# Patient Record
Sex: FEMALE | Race: WHITE | HISPANIC OR LATINO | Employment: FULL TIME | URBAN - METROPOLITAN AREA
[De-identification: names, ages, dates, MRNs, and addresses within clinical notes are randomized per-mention and may not be internally consistent; named-entity substitution may affect disease eponyms.]

---

## 2023-12-05 NOTE — PROGRESS NOTES
"No referral in the system but it looks like the patient has been at the [facility] and probably has fibromyalgia with multiple musculoskeletal symptoms last note from spine health:  Impression:  Scoliosis  Cervical DDD  Chronic neck and medial scapula pain-may be some C6 referral pain.     Overall pain manageable currently as had recent TPI which helps apx 2 months.     Plan:  typical management options reviewed.  Questions were answered,      Does not feel return to PT will be helpful-tried in past w/o benefit several times.  Last PT attempt apx 2019.  2.    Patient Inquires re SCS trial, pain pump, and disc replacement which I advised I would not recommend at this time.  Typical surgical indications reviewed.  3.    May consider cervical SHEKHAR as is noting medial scapular pain that may be referral pain from the C5/6 but would like to see back in person to best assess.  Patient also notes would like to look into a \"fix\"  4.    Encouraged scoliosis x-ray  5.    F/U: in person.   May consider the cervical SHEKHAR, thoracic MRI, Dr. Ruelas consult.  Patient would also like images of MRI, advised to contact radiology, will attempt to get # for such.     The patient is instructed to call/seek urgent medical care with worsening pain or change of neurological status.     There are no barriers to patient education identified. Management options were discussed in detail.  The patient is in agreement with the plan as outlined above and verbalized understanding.       Trinh Castillo MD      History of Present Complaint:  The patient was referred to us by Referring Provider: Self-referral. this is 28 y.o.  female with a past history of history of scoliosis, bruising and gum bleeding she has been evaluated at [facility] with normal platelet count and function, PTT, INR, and negative von Willebrand panel.  The patient is here because of almost total spine pain but the worst is in the thoracic spine.  Patient has no paralysis no " paresthesias no incontinence.  The pain is localized to axial areas.  She never had any scoliosis evaluation by surgery.  And I do not see any x-rays of her scoliosis.  The patient has no red flags.        Procedures:   None    Portions of record reviewed for pertinent issues: active problem list, medication list, allergies, family history, social history, notes from last encounter, encounters, lab results, imaging and other available records.    I have personally reviewed the OARRS report for this patient. This report is scanned into the electronic medical record. I have considered the risks of abuse, dependence, addiction and diversion. It showed: No chronic opioid therapy  OPIOID RISK ASSESSMENT SCORE 6/26  Aberrant behavior: None      Diagnostic studies:  9/27/2023 MRI of the cervical spine at [facility] report showed mild stenosis at C4-5 to C6-7 otherwise normal MRI:  Mild cervical degenerative disc disease with mild canal stenosis at C4-5   through C6-7 as detailed above.  Otherwise normal study.  No evidence of   cord compression or intrinsic abnormality of the upper spinal cord by MRI.     Anatomic Variant:  None.  Assume 7 cervical vertebrae with counting from   the craniocervical junction.         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     Social History:  planning on having children and we discussed that and the impact of therapy on getting pregnant, finished masters degree in education denies smoking drinking or use of illicit drugs      Review of Systems   HENT: Negative.     Eyes: Negative.    Respiratory: Negative.     Cardiovascular: Negative.    Gastrointestinal: Negative.    Endocrine: Negative.    Genitourinary: Negative.    Musculoskeletal:  Positive for back pain and myalgias.   Skin: Negative.    Neurological: Negative.    Hematological: Negative.    Psychiatric/Behavioral: Negative.        Physical Exam  Vitals and nursing  note reviewed.   Constitutional:       Appearance: Normal appearance.   HENT:      Head: Normocephalic and atraumatic.      Nose: Nose normal.   Eyes:      Extraocular Movements: Extraocular movements intact.      Conjunctiva/sclera: Conjunctivae normal.      Pupils: Pupils are equal, round, and reactive to light.   Cardiovascular:      Rate and Rhythm: Normal rate and regular rhythm.      Pulses: Normal pulses.      Heart sounds: Normal heart sounds.   Pulmonary:      Effort: Pulmonary effort is normal.      Breath sounds: Normal breath sounds.   Abdominal:      General: Abdomen is flat. Bowel sounds are normal.      Palpations: Abdomen is soft.   Musculoskeletal:         General: Tenderness present.      Comments: S-shaped scoliosis of her thoracolumbar spine   Skin:     General: Skin is warm.   Neurological:      General: No focal deficit present.      Mental Status: She is alert and oriented to person, place, and time.      Cranial Nerves: Cranial nerves 2-12 are intact.      Sensory: Sensation is intact.      Motor: Motor function is intact.      Coordination: Coordination is intact.      Gait: Gait is intact.      Deep Tendon Reflexes: Reflexes are normal and symmetric.   Psychiatric:         Mood and Affect: Mood normal.         Behavior: Behavior normal.            Assessment  Very pleasant 28 years old with history of scoliosis and chronic back pain extend from her cervical spine to lumbar spine.  Mid thoracic spine is the worst where the curvature of the scoliosis is the worst.  The patient never been evaluated by surgeons.  Her exam today did not show any neurological deficit but for scoliotic changes of the thoracolumbar spine in addition to pain with extension lateral bending.  The patient is really eager to have babies and I explained to her that she cannot have babies while she is getting treatment from me.  We will start her on the IV infusion therapy she already have spine physical therapist who  works on her spine.  We will get her connected with the [facility] and I highly recommend annual x-ray of her scoliosis.  I may send her to Dr. Bates in the future.           Plan  At least 50% of the visit was involved in the discussion of the options for treatment. We discussed exercises, medication, interventional therapies and surgery. Healthy life style is essential with patient hard work to achieve the wellness. In addition; discussion with the patient and/or family about any of the diagnostic results, impressions and/or recommended diagnostic studies, prognosis, risks and benefits of treatment options, instructions for treatment and/or follow-up, importance of compliance with chosen treatment options, risk-factor reduction, and patient/family education.         Pool therapy, walking in the pool, at least 3x per week for 30 minutes  Neuro supplement ordered  Referral to [facility]  Patient to continue with her spine physical therapist  Total spine scoliosis x-ray ordered today  IV infusion therapy once every 2 weeks  Healthy lifestyle and anti-inflammatory diet in addition to weight control discussed with the patient  Alternative chronic pain therapies was discussed, encouraged and information was handed  Return to Clinic 3 months       *Please note this report has been produced using speech recognition software and may contain errors related to that system including grammar, punctuation and spelling as well as words and phrases that may be inappropriate. If there are questions or concerns, please feel free to contact me to clarify.    Slime Fu MD

## 2023-12-05 NOTE — H&P (VIEW-ONLY)
"No referral in the system but it looks like the patient has been at the [facility] and probably has fibromyalgia with multiple musculoskeletal symptoms last note from spine health:  Impression:  Scoliosis  Cervical DDD  Chronic neck and medial scapula pain-may be some C6 referral pain.     Overall pain manageable currently as had recent TPI which helps apx 2 months.     Plan:  typical management options reviewed.  Questions were answered,      Does not feel return to PT will be helpful-tried in past w/o benefit several times.  Last PT attempt apx 2019.  2.    Patient Inquires re SCS trial, pain pump, and disc replacement which I advised I would not recommend at this time.  Typical surgical indications reviewed.  3.    May consider cervical SHEKHAR as is noting medial scapular pain that may be referral pain from the C5/6 but would like to see back in person to best assess.  Patient also notes would like to look into a \"fix\"  4.    Encouraged scoliosis x-ray  5.    F/U: in person.   May consider the cervical SHEKHAR, thoracic MRI, Dr. Ruelas consult.  Patient would also like images of MRI, advised to contact radiology, will attempt to get # for such.     The patient is instructed to call/seek urgent medical care with worsening pain or change of neurological status.     There are no barriers to patient education identified. Management options were discussed in detail.  The patient is in agreement with the plan as outlined above and verbalized understanding.       Trinh Castillo MD      History of Present Complaint:  The patient was referred to us by Referring Provider: Self-referral. this is 28 y.o.  female with a past history of history of scoliosis, bruising and gum bleeding she has been evaluated at [facility] with normal platelet count and function, PTT, INR, and negative von Willebrand panel.  The patient is here because of almost total spine pain but the worst is in the thoracic spine.  Patient has no paralysis no " paresthesias no incontinence.  The pain is localized to axial areas.  She never had any scoliosis evaluation by surgery.  And I do not see any x-rays of her scoliosis.  The patient has no red flags.        Procedures:   None    Portions of record reviewed for pertinent issues: active problem list, medication list, allergies, family history, social history, notes from last encounter, encounters, lab results, imaging and other available records.    I have personally reviewed the OARRS report for this patient. This report is scanned into the electronic medical record. I have considered the risks of abuse, dependence, addiction and diversion. It showed: No chronic opioid therapy  OPIOID RISK ASSESSMENT SCORE 6/26  Aberrant behavior: None      Diagnostic studies:  9/27/2023 MRI of the cervical spine at [facility] report showed mild stenosis at C4-5 to C6-7 otherwise normal MRI:  Mild cervical degenerative disc disease with mild canal stenosis at C4-5   through C6-7 as detailed above.  Otherwise normal study.  No evidence of   cord compression or intrinsic abnormality of the upper spinal cord by MRI.     Anatomic Variant:  None.  Assume 7 cervical vertebrae with counting from   the craniocervical junction.         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     Social History:  planning on having children and we discussed that and the impact of therapy on getting pregnant, finished masters degree in education denies smoking drinking or use of illicit drugs      Review of Systems   HENT: Negative.     Eyes: Negative.    Respiratory: Negative.     Cardiovascular: Negative.    Gastrointestinal: Negative.    Endocrine: Negative.    Genitourinary: Negative.    Musculoskeletal:  Positive for back pain and myalgias.   Skin: Negative.    Neurological: Negative.    Hematological: Negative.    Psychiatric/Behavioral: Negative.        Physical Exam  Vitals and nursing  note reviewed.   Constitutional:       Appearance: Normal appearance.   HENT:      Head: Normocephalic and atraumatic.      Nose: Nose normal.   Eyes:      Extraocular Movements: Extraocular movements intact.      Conjunctiva/sclera: Conjunctivae normal.      Pupils: Pupils are equal, round, and reactive to light.   Cardiovascular:      Rate and Rhythm: Normal rate and regular rhythm.      Pulses: Normal pulses.      Heart sounds: Normal heart sounds.   Pulmonary:      Effort: Pulmonary effort is normal.      Breath sounds: Normal breath sounds.   Abdominal:      General: Abdomen is flat. Bowel sounds are normal.      Palpations: Abdomen is soft.   Musculoskeletal:         General: Tenderness present.      Comments: S-shaped scoliosis of her thoracolumbar spine   Skin:     General: Skin is warm.   Neurological:      General: No focal deficit present.      Mental Status: She is alert and oriented to person, place, and time.      Cranial Nerves: Cranial nerves 2-12 are intact.      Sensory: Sensation is intact.      Motor: Motor function is intact.      Coordination: Coordination is intact.      Gait: Gait is intact.      Deep Tendon Reflexes: Reflexes are normal and symmetric.   Psychiatric:         Mood and Affect: Mood normal.         Behavior: Behavior normal.            Assessment  Very pleasant 28 years old with history of scoliosis and chronic back pain extend from her cervical spine to lumbar spine.  Mid thoracic spine is the worst where the curvature of the scoliosis is the worst.  The patient never been evaluated by surgeons.  Her exam today did not show any neurological deficit but for scoliotic changes of the thoracolumbar spine in addition to pain with extension lateral bending.  The patient is really eager to have babies and I explained to her that she cannot have babies while she is getting treatment from me.  We will start her on the IV infusion therapy she already have spine physical therapist who  works on her spine.  We will get her connected with the [facility] and I highly recommend annual x-ray of her scoliosis.  I may send her to Dr. Bates in the future.           Plan  At least 50% of the visit was involved in the discussion of the options for treatment. We discussed exercises, medication, interventional therapies and surgery. Healthy life style is essential with patient hard work to achieve the wellness. In addition; discussion with the patient and/or family about any of the diagnostic results, impressions and/or recommended diagnostic studies, prognosis, risks and benefits of treatment options, instructions for treatment and/or follow-up, importance of compliance with chosen treatment options, risk-factor reduction, and patient/family education.         Pool therapy, walking in the pool, at least 3x per week for 30 minutes  Neuro supplement ordered  Referral to [facility]  Patient to continue with her spine physical therapist  Total spine scoliosis x-ray ordered today  IV infusion therapy once every 2 weeks  Healthy lifestyle and anti-inflammatory diet in addition to weight control discussed with the patient  Alternative chronic pain therapies was discussed, encouraged and information was handed  Return to Clinic 3 months       *Please note this report has been produced using speech recognition software and may contain errors related to that system including grammar, punctuation and spelling as well as words and phrases that may be inappropriate. If there are questions or concerns, please feel free to contact me to clarify.    Slime Fu MD

## 2023-12-06 ENCOUNTER — OFFICE VISIT (OUTPATIENT)
Dept: PAIN MEDICINE | Facility: CLINIC | Age: 28
End: 2023-12-06
Payer: COMMERCIAL

## 2023-12-06 VITALS
WEIGHT: 170 LBS | HEART RATE: 106 BPM | TEMPERATURE: 97.9 F | RESPIRATION RATE: 16 BRPM | HEIGHT: 64 IN | SYSTOLIC BLOOD PRESSURE: 121 MMHG | DIASTOLIC BLOOD PRESSURE: 80 MMHG | BODY MASS INDEX: 29.02 KG/M2

## 2023-12-06 DIAGNOSIS — M79.7 FIBROMYALGIA: ICD-10-CM

## 2023-12-06 DIAGNOSIS — M41.115 JUVENILE IDIOPATHIC SCOLIOSIS OF THORACOLUMBAR REGION: ICD-10-CM

## 2023-12-06 DIAGNOSIS — M47.25 OTHER SPONDYLOSIS WITH RADICULOPATHY, THORACOLUMBAR REGION: Primary | ICD-10-CM

## 2023-12-06 PROCEDURE — 99214 OFFICE O/P EST MOD 30 MIN: CPT | Performed by: ANESTHESIOLOGY

## 2023-12-06 PROCEDURE — 99204 OFFICE O/P NEW MOD 45 MIN: CPT | Performed by: ANESTHESIOLOGY

## 2023-12-06 PROCEDURE — 1036F TOBACCO NON-USER: CPT | Performed by: ANESTHESIOLOGY

## 2023-12-06 RX ORDER — ONDANSETRON HYDROCHLORIDE 2 MG/ML
4 INJECTION, SOLUTION INTRAVENOUS ONCE
OUTPATIENT
Start: 2023-12-06 | End: 2023-12-06

## 2023-12-06 RX ORDER — KETOROLAC TROMETHAMINE 30 MG/ML
30 INJECTION, SOLUTION INTRAMUSCULAR; INTRAVENOUS ONCE
OUTPATIENT
Start: 2023-12-06 | End: 2023-12-06

## 2023-12-06 RX ORDER — NITROGLYCERIN 0.4 MG/1
0.4 TABLET SUBLINGUAL ONCE
OUTPATIENT
Start: 2023-12-06 | End: 2023-12-06

## 2023-12-06 RX ORDER — FAMOTIDINE 10 MG/ML
20 INJECTION INTRAVENOUS ONCE AS NEEDED
OUTPATIENT
Start: 2023-12-06

## 2023-12-06 RX ORDER — LORAZEPAM 2 MG/1
TABLET ORAL
Qty: 1 TABLET | Refills: 0 | Status: SHIPPED | OUTPATIENT
Start: 2023-12-06

## 2023-12-06 RX ORDER — EPINEPHRINE 0.3 MG/.3ML
0.3 INJECTION SUBCUTANEOUS EVERY 5 MIN PRN
OUTPATIENT
Start: 2023-12-06

## 2023-12-06 RX ORDER — METOPROLOL TARTRATE 25 MG/1
25 TABLET, FILM COATED ORAL ONCE
OUTPATIENT
Start: 2023-12-06 | End: 2023-12-06

## 2023-12-06 RX ORDER — LISDEXAMFETAMINE DIMESYLATE 50 MG/1
50 CAPSULE ORAL DAILY
COMMUNITY
Start: 2023-08-28

## 2023-12-06 RX ORDER — DIPHENHYDRAMINE HYDROCHLORIDE 50 MG/ML
50 INJECTION INTRAMUSCULAR; INTRAVENOUS AS NEEDED
OUTPATIENT
Start: 2023-12-06

## 2023-12-06 RX ORDER — ALBUTEROL SULFATE 0.83 MG/ML
3 SOLUTION RESPIRATORY (INHALATION) AS NEEDED
OUTPATIENT
Start: 2023-12-06

## 2023-12-06 RX ORDER — DULOXETIN HYDROCHLORIDE 60 MG/1
60 CAPSULE, DELAYED RELEASE ORAL DAILY
COMMUNITY

## 2023-12-06 ASSESSMENT — ENCOUNTER SYMPTOMS
GASTROINTESTINAL NEGATIVE: 1
MYALGIAS: 1
HEMATOLOGIC/LYMPHATIC NEGATIVE: 1
LOSS OF SENSATION IN FEET: 0
PSYCHIATRIC NEGATIVE: 1
NEUROLOGICAL NEGATIVE: 1
BACK PAIN: 1
DEPRESSION: 0
OCCASIONAL FEELINGS OF UNSTEADINESS: 0
CARDIOVASCULAR NEGATIVE: 1
RESPIRATORY NEGATIVE: 1
EYES NEGATIVE: 1
ENDOCRINE NEGATIVE: 1

## 2023-12-06 ASSESSMENT — COLUMBIA-SUICIDE SEVERITY RATING SCALE - C-SSRS
6. HAVE YOU EVER DONE ANYTHING, STARTED TO DO ANYTHING, OR PREPARED TO DO ANYTHING TO END YOUR LIFE?: NO
2. HAVE YOU ACTUALLY HAD ANY THOUGHTS OF KILLING YOURSELF?: NO
1. IN THE PAST MONTH, HAVE YOU WISHED YOU WERE DEAD OR WISHED YOU COULD GO TO SLEEP AND NOT WAKE UP?: NO

## 2023-12-06 ASSESSMENT — PAIN SCALES - GENERAL
PAINLEVEL_OUTOF10: 6
PAINLEVEL: 6

## 2023-12-06 ASSESSMENT — PATIENT HEALTH QUESTIONNAIRE - PHQ9
2. FEELING DOWN, DEPRESSED OR HOPELESS: NOT AT ALL
1. LITTLE INTEREST OR PLEASURE IN DOING THINGS: NOT AT ALL
SUM OF ALL RESPONSES TO PHQ9 QUESTIONS 1 AND 2: 0

## 2023-12-06 ASSESSMENT — PAIN DESCRIPTION - DESCRIPTORS: DESCRIPTORS: ACHING;BURNING

## 2023-12-06 ASSESSMENT — PAIN - FUNCTIONAL ASSESSMENT: PAIN_FUNCTIONAL_ASSESSMENT: 0-10

## 2023-12-06 ASSESSMENT — LIFESTYLE VARIABLES: TOTAL SCORE: 6

## 2023-12-06 NOTE — PROGRESS NOTES
Chief Complaint   Patient presents with    Back Pain    New Patient Visit    Neck Pain     History of Present Complaint:  The patient was referred to us by Referring Provider: Self referral . this is 28 y.o.  female  Not accompanied by another person with a past history of  history of Raynaud's ,anxiety, scoliosis, binge eating.  presenting with  back pain neck pain.  Patient states that she gets numbness in her hands.  You    Pain started 2016 has progressively got worse 2020   Pain is Progressively getting worse  Back and neck worse   The pain is described as  back pain and dull aching lots of burning  and is relieved by Kenalog trigger point injections that she had done by her self or her  .      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  Social history: , Finished high school, and Higher education Masters nonprofit organization    Diagnostic studies: MRI studies, xray's    Opioid Risk Assessment Score 6/26 history of anxiety.

## 2023-12-14 PROBLEM — N92.6 IRREGULAR MENSTRUAL CYCLE: Status: ACTIVE | Noted: 2023-03-23

## 2023-12-14 PROBLEM — F50.819 BINGE EATING DISORDER: Status: ACTIVE | Noted: 2023-03-23

## 2023-12-14 PROBLEM — I73.00 RAYNAUD'S DISEASE: Status: ACTIVE | Noted: 2023-12-14

## 2023-12-14 PROBLEM — M41.9 SCOLIOSIS: Status: ACTIVE | Noted: 2020-02-05

## 2023-12-14 PROBLEM — F50.81 BINGE EATING DISORDER: Status: ACTIVE | Noted: 2023-03-23

## 2023-12-14 PROBLEM — B37.31 YEAST VAGINITIS: Status: ACTIVE | Noted: 2023-12-14

## 2023-12-18 ENCOUNTER — HOSPITAL ENCOUNTER (OUTPATIENT)
Dept: PAIN MEDICINE | Facility: CLINIC | Age: 28
Discharge: HOME | End: 2023-12-18
Payer: COMMERCIAL

## 2023-12-18 ENCOUNTER — ANCILLARY PROCEDURE (OUTPATIENT)
Dept: RADIOLOGY | Facility: CLINIC | Age: 28
End: 2023-12-18
Payer: COMMERCIAL

## 2023-12-18 VITALS
WEIGHT: 130 LBS | SYSTOLIC BLOOD PRESSURE: 113 MMHG | HEART RATE: 116 BPM | RESPIRATION RATE: 16 BRPM | HEIGHT: 64 IN | TEMPERATURE: 98.4 F | BODY MASS INDEX: 22.2 KG/M2 | OXYGEN SATURATION: 100 % | DIASTOLIC BLOOD PRESSURE: 78 MMHG

## 2023-12-18 DIAGNOSIS — M41.115 JUVENILE IDIOPATHIC SCOLIOSIS OF THORACOLUMBAR REGION: ICD-10-CM

## 2023-12-18 DIAGNOSIS — M79.7 FIBROMYALGIA: ICD-10-CM

## 2023-12-18 LAB — PREGNANCY TEST URINE, POC: NEGATIVE

## 2023-12-18 PROCEDURE — A4216 STERILE WATER/SALINE, 10 ML: HCPCS

## 2023-12-18 PROCEDURE — 62323 NJX INTERLAMINAR LMBR/SAC: CPT | Performed by: ANESTHESIOLOGY

## 2023-12-18 PROCEDURE — 2500000005 HC RX 250 GENERAL PHARMACY W/O HCPCS

## 2023-12-18 PROCEDURE — 77003 FLUOROGUIDE FOR SPINE INJECT: CPT

## 2023-12-18 PROCEDURE — 2500000004 HC RX 250 GENERAL PHARMACY W/ HCPCS (ALT 636 FOR OP/ED)

## 2023-12-18 PROCEDURE — 7100000010 HC PHASE TWO TIME - EACH INCREMENTAL 1 MINUTE

## 2023-12-18 PROCEDURE — 7100000009 HC PHASE TWO TIME - INITIAL BASE CHARGE

## 2023-12-18 RX ORDER — METHYLPREDNISOLONE ACETATE 80 MG/ML
INJECTION, SUSPENSION INTRA-ARTICULAR; INTRALESIONAL; INTRAMUSCULAR; SOFT TISSUE
Status: COMPLETED
Start: 2023-12-18 | End: 2023-12-18

## 2023-12-18 RX ORDER — SODIUM CHLORIDE 9 MG/ML
INJECTION, SOLUTION INTRAMUSCULAR; INTRAVENOUS; SUBCUTANEOUS
Status: COMPLETED
Start: 2023-12-18 | End: 2023-12-18

## 2023-12-18 RX ORDER — LIDOCAINE HYDROCHLORIDE 5 MG/ML
INJECTION, SOLUTION INFILTRATION; INTRAVENOUS
Status: COMPLETED
Start: 2023-12-18 | End: 2023-12-18

## 2023-12-18 RX ADMIN — SODIUM CHLORIDE 10 ML: 9 INJECTION, SOLUTION INTRAMUSCULAR; INTRAVENOUS; SUBCUTANEOUS at 09:06

## 2023-12-18 RX ADMIN — METHYLPREDNISOLONE ACETATE 80 MG: 80 INJECTION, SUSPENSION INTRA-ARTICULAR; INTRALESIONAL; INTRAMUSCULAR; SOFT TISSUE at 09:06

## 2023-12-18 RX ADMIN — LIDOCAINE HYDROCHLORIDE 250 MG: 5 INJECTION, SOLUTION INFILTRATION at 09:06

## 2023-12-18 ASSESSMENT — COLUMBIA-SUICIDE SEVERITY RATING SCALE - C-SSRS
6. HAVE YOU EVER DONE ANYTHING, STARTED TO DO ANYTHING, OR PREPARED TO DO ANYTHING TO END YOUR LIFE?: NO
1. IN THE PAST MONTH, HAVE YOU WISHED YOU WERE DEAD OR WISHED YOU COULD GO TO SLEEP AND NOT WAKE UP?: NO
2. HAVE YOU ACTUALLY HAD ANY THOUGHTS OF KILLING YOURSELF?: NO

## 2023-12-18 ASSESSMENT — PAIN SCALES - GENERAL
PAINLEVEL_OUTOF10: 0 - NO PAIN
PAINLEVEL_OUTOF10: 8

## 2023-12-18 ASSESSMENT — PAIN - FUNCTIONAL ASSESSMENT: PAIN_FUNCTIONAL_ASSESSMENT: 0-10

## 2023-12-18 NOTE — OP NOTE
Patient has no changes in health medical management or allergies since last visit on 12/6/2023    Preop diagnosis: Lumbar spondylosis with radiculopathy  Postoperative diagnosis: The same    PROCEDURE bilateralL1-L2  Lumbar Interlaminar Epidural    Estimated blood loss: None  Complication: None     CLINICAL NOTE: The patient is a pleasant 28 y.o.  female with a significant history of bilateral mid thoracic pain.  Physical exam and radiological findings correlate with the pre-operative diagnoses.  Because of these findings we elected to proceed with transforaminal epidural steroid injection at the above level (s).      PROCEDURE: After sufficient consent was signed, the patient was brought to the Operating Room, and placed in a prone position with a pillow underneath the hip. The patient's lumbar spine was in a flexed position. The lumbar area was then prepped and draped in the usual fashion.     Under fluoroscopic guidance, the bilateralL1-L2 interspace was identified. The skin at the entry site was infiltrated with 1% Lidocaine using a size 25 gauge needle. A size Touhy needle 18G, 3.5 inch was introduced gradually until the inferior lamina of the interspace was encountered. The needle was gradually advanced until it entered into the ligament. At that time loss of resistance technique was used, and the needle advanced gradually until the negative pressure was noticed in the epidural space. The bevel of the needle was placed laterally, and 3 cc of Omnipaque 300 were injected. This revealed excellent distribution of the dye in the epidural space, and around the nerve root of the affected side of the lumbar spine. At that time, a mixture of Depo-Medrol 80 mg Lidocaine 30  mg and Normal Saline for a total volume of 10 cc were injected . The needle was flushed and removed.     The patient tolerated the procedure very well and was transferred to the Recovery Room in stable condition.  The patient had stable resolution of  symptoms.  Patient to follow-up in the Pain Management Clinic as scheduled.

## 2023-12-20 ENCOUNTER — TELEPHONE (OUTPATIENT)
Dept: PAIN MEDICINE | Facility: CLINIC | Age: 28
End: 2023-12-20
Payer: COMMERCIAL

## 2024-01-21 NOTE — H&P (VIEW-ONLY)
2/2/2024 addendum:  1/31/2020.  EMG done by Dr. Gayla Howard MD showed carpal tunnel syndrome no signs of radiculopathy    SUBJECTIVE:  This is 28 y.o.  female with PMH of scoliosis, bruising and gum bleeding she has been evaluated at [facility] with normal platelet count and function, PTT, INR, and negative von Willebrand panel.  The patient had significant spinal pain worse in the mid thoracic and lower thoracic area received bilateral L1-2 interlaminar SHEKHAR on 12/18/2023 who is here for follow-up discussing the pain in her neck and the tingling in her right thumb.  Carpal tunnel maneuver caused some increased tingling in the thumb.  I will order an EMG on her to differentiate between radiculopathy and carpal tunnel.  Her MRI results from [facility] showed very mild foraminal stenosis does not explain radiculitis.  The patient stated that when she does neck traction that released the pressure on her thumb and that the tingling goes away.  We are going to proceed with cervical epidural steroid injection on the right side at C6-7 after lorazepam and covering her pelvis with a C arm down to prevent radiation to her ovaries.  Also I discussed with her that the lorazepam is not a medication she can get pregnant while she is taking it.      Prior office visit:  12/6/2023: The patient was referred to us by Referring Provider: Self-referral. this is 28 y.o.  female with a past history of history of scoliosis, bruising and gum bleeding she has been evaluated at [facility] with normal platelet count and function, PTT, INR, and negative von Willebrand panel.  The patient is here because of almost total spine pain but the worst is in the thoracic spine.  Patient has no paralysis no paresthesias no incontinence.  The pain is localized to axial areas.  She never had any scoliosis evaluation by surgery.  And I do not see any x-rays of her scoliosis.  The patient has no red flags.           Procedures:   12/18/2023 bilateral L1-2  interlaminar SHEKHAR the patient has had a 0% improvement in pain and function     Portions of record reviewed for pertinent issues: active problem list, medication list, allergies, family history, social history, notes from last encounter, encounters, lab results, imaging and other available records.     I have personally reviewed the OARRS report for this patient. This report is scanned into the electronic medical record. I have considered the risks of abuse, dependence, addiction and diversion. It showed: No chronic opioid therapy, Vyvanse 60 mg  OPIOID RISK ASSESSMENT SCORE 6/26  Aberrant behavior: None        Diagnostic studies:  9/27/2023 MRI of the cervical spine at [facility] report showed mild stenosis at C4-5 to C6-7 otherwise normal MRI:  Mild cervical degenerative disc disease with mild canal stenosis at C4-5   through C6-7 as detailed above.  Otherwise normal study.  No evidence of   cord compression or intrinsic abnormality of the upper spinal cord by MRI.     Anatomic Variant:  None.  Assume 7 cervical vertebrae with counting from   the craniocervical junction.            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      Social History:  planning on having children and we discussed that and the impact of therapy on getting pregnant, finished masters degree in education denies smoking drinking or use of illicit drugs      Review of Systems   HENT: Negative.     Eyes: Negative.    Respiratory: Negative.     Cardiovascular: Negative.    Gastrointestinal: Negative.    Endocrine: Negative.    Genitourinary: Negative.    Musculoskeletal:  Positive for myalgias and neck pain.   Skin: Negative.    Neurological:         Right hand thumb tingling   Hematological: Negative.    Psychiatric/Behavioral: Negative.          Physical Exam  Vitals and nursing note reviewed.   Constitutional:       Appearance: Normal appearance.   HENT:      Head: Normocephalic  and atraumatic.      Nose: Nose normal.   Eyes:      Extraocular Movements: Extraocular movements intact.      Conjunctiva/sclera: Conjunctivae normal.      Pupils: Pupils are equal, round, and reactive to light.   Cardiovascular:      Rate and Rhythm: Normal rate and regular rhythm.      Pulses: Normal pulses.      Heart sounds: Normal heart sounds.   Pulmonary:      Effort: Pulmonary effort is normal.      Breath sounds: Normal breath sounds.   Abdominal:      General: Abdomen is flat. Bowel sounds are normal.      Palpations: Abdomen is soft.   Musculoskeletal:         General: Tenderness present.   Skin:     General: Skin is warm.   Neurological:      General: No focal deficit present.      Mental Status: She is alert and oriented to person, place, and time.   Psychiatric:         Mood and Affect: Mood normal.         Behavior: Behavior normal.                      Plan  At least 50% of the visit was involved in the discussion of the options for treatment. We discussed exercises, medication, interventional therapies and surgery. Healthy life style is essential with patient hard work to achieve the wellness. In addition; discussion with the patient and/or family about any of the diagnostic results, impressions and/or recommended diagnostic studies, prognosis, risks and benefits of treatment options, instructions for treatment and/or follow-up, importance of compliance with chosen treatment options, risk-factor reduction, and patient/family education.         Pool therapy, walking in the pool, at least 3x per week for 30 minutes  Continue self-directed physical therapy  EMG of the right upper extremity to differentiate between carpal tunnel syndrome versus radiculopathy  Right C6-7 interlaminar SHEKHAR after lorazepam and use a gown to cover the abdomen and the pelvis to prevent radiation of the ovaries during the procedure  Healthy lifestyle and anti-inflammatory diet in addition to weight control discussed with the  patient  Alternative chronic pain therapies was discussed, encouraged and information was handed  Return to Clinic 3 months     *Please note this report has been produced using speech recognition software and may contain errors related to that system including grammar, punctuation and spelling as well as words and phrases that may be inappropriate. If there are questions or concerns, please feel free to contact me to clarify.    Slime Fu MD

## 2024-01-21 NOTE — PROGRESS NOTES
2/2/2024 addendum:  1/31/2020.  EMG done by Dr. Gayla Howard MD showed carpal tunnel syndrome no signs of radiculopathy    SUBJECTIVE:  This is 28 y.o.  female with PMH of scoliosis, bruising and gum bleeding she has been evaluated at [facility] with normal platelet count and function, PTT, INR, and negative von Willebrand panel.  The patient had significant spinal pain worse in the mid thoracic and lower thoracic area received bilateral L1-2 interlaminar SHEKHAR on 12/18/2023 who is here for follow-up discussing the pain in her neck and the tingling in her right thumb.  Carpal tunnel maneuver caused some increased tingling in the thumb.  I will order an EMG on her to differentiate between radiculopathy and carpal tunnel.  Her MRI results from [facility] showed very mild foraminal stenosis does not explain radiculitis.  The patient stated that when she does neck traction that released the pressure on her thumb and that the tingling goes away.  We are going to proceed with cervical epidural steroid injection on the right side at C6-7 after lorazepam and covering her pelvis with a C arm down to prevent radiation to her ovaries.  Also I discussed with her that the lorazepam is not a medication she can get pregnant while she is taking it.      Prior office visit:  12/6/2023: The patient was referred to us by Referring Provider: Self-referral. this is 28 y.o.  female with a past history of history of scoliosis, bruising and gum bleeding she has been evaluated at [facility] with normal platelet count and function, PTT, INR, and negative von Willebrand panel.  The patient is here because of almost total spine pain but the worst is in the thoracic spine.  Patient has no paralysis no paresthesias no incontinence.  The pain is localized to axial areas.  She never had any scoliosis evaluation by surgery.  And I do not see any x-rays of her scoliosis.  The patient has no red flags.           Procedures:   12/18/2023 bilateral L1-2  interlaminar SHEKHAR the patient has had a 0% improvement in pain and function     Portions of record reviewed for pertinent issues: active problem list, medication list, allergies, family history, social history, notes from last encounter, encounters, lab results, imaging and other available records.     I have personally reviewed the OARRS report for this patient. This report is scanned into the electronic medical record. I have considered the risks of abuse, dependence, addiction and diversion. It showed: No chronic opioid therapy, Vyvanse 60 mg  OPIOID RISK ASSESSMENT SCORE 6/26  Aberrant behavior: None        Diagnostic studies:  9/27/2023 MRI of the cervical spine at [facility] report showed mild stenosis at C4-5 to C6-7 otherwise normal MRI:  Mild cervical degenerative disc disease with mild canal stenosis at C4-5   through C6-7 as detailed above.  Otherwise normal study.  No evidence of   cord compression or intrinsic abnormality of the upper spinal cord by MRI.     Anatomic Variant:  None.  Assume 7 cervical vertebrae with counting from   the craniocervical junction.            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      Social History:  planning on having children and we discussed that and the impact of therapy on getting pregnant, finished masters degree in education denies smoking drinking or use of illicit drugs      Review of Systems   HENT: Negative.     Eyes: Negative.    Respiratory: Negative.     Cardiovascular: Negative.    Gastrointestinal: Negative.    Endocrine: Negative.    Genitourinary: Negative.    Musculoskeletal:  Positive for myalgias and neck pain.   Skin: Negative.    Neurological:         Right hand thumb tingling   Hematological: Negative.    Psychiatric/Behavioral: Negative.          Physical Exam  Vitals and nursing note reviewed.   Constitutional:       Appearance: Normal appearance.   HENT:      Head: Normocephalic  and atraumatic.      Nose: Nose normal.   Eyes:      Extraocular Movements: Extraocular movements intact.      Conjunctiva/sclera: Conjunctivae normal.      Pupils: Pupils are equal, round, and reactive to light.   Cardiovascular:      Rate and Rhythm: Normal rate and regular rhythm.      Pulses: Normal pulses.      Heart sounds: Normal heart sounds.   Pulmonary:      Effort: Pulmonary effort is normal.      Breath sounds: Normal breath sounds.   Abdominal:      General: Abdomen is flat. Bowel sounds are normal.      Palpations: Abdomen is soft.   Musculoskeletal:         General: Tenderness present.   Skin:     General: Skin is warm.   Neurological:      General: No focal deficit present.      Mental Status: She is alert and oriented to person, place, and time.   Psychiatric:         Mood and Affect: Mood normal.         Behavior: Behavior normal.                      Plan  At least 50% of the visit was involved in the discussion of the options for treatment. We discussed exercises, medication, interventional therapies and surgery. Healthy life style is essential with patient hard work to achieve the wellness. In addition; discussion with the patient and/or family about any of the diagnostic results, impressions and/or recommended diagnostic studies, prognosis, risks and benefits of treatment options, instructions for treatment and/or follow-up, importance of compliance with chosen treatment options, risk-factor reduction, and patient/family education.         Pool therapy, walking in the pool, at least 3x per week for 30 minutes  Continue self-directed physical therapy  EMG of the right upper extremity to differentiate between carpal tunnel syndrome versus radiculopathy  Right C6-7 interlaminar SHEKHAR after lorazepam and use a gown to cover the abdomen and the pelvis to prevent radiation of the ovaries during the procedure  Healthy lifestyle and anti-inflammatory diet in addition to weight control discussed with the  patient  Alternative chronic pain therapies was discussed, encouraged and information was handed  Return to Clinic 3 months     *Please note this report has been produced using speech recognition software and may contain errors related to that system including grammar, punctuation and spelling as well as words and phrases that may be inappropriate. If there are questions or concerns, please feel free to contact me to clarify.    Sliem Fu MD

## 2024-01-23 ENCOUNTER — OFFICE VISIT (OUTPATIENT)
Dept: PAIN MEDICINE | Facility: CLINIC | Age: 29
End: 2024-01-23
Payer: COMMERCIAL

## 2024-01-23 VITALS
HEART RATE: 101 BPM | SYSTOLIC BLOOD PRESSURE: 119 MMHG | TEMPERATURE: 97.5 F | RESPIRATION RATE: 16 BRPM | BODY MASS INDEX: 29.02 KG/M2 | WEIGHT: 170 LBS | DIASTOLIC BLOOD PRESSURE: 89 MMHG | HEIGHT: 64 IN

## 2024-01-23 DIAGNOSIS — G56.01 CARPAL TUNNEL SYNDROME OF RIGHT WRIST: Primary | ICD-10-CM

## 2024-01-23 DIAGNOSIS — M47.22 CERVICAL SPONDYLOSIS WITH RADICULOPATHY: ICD-10-CM

## 2024-01-23 PROCEDURE — 99214 OFFICE O/P EST MOD 30 MIN: CPT | Performed by: ANESTHESIOLOGY

## 2024-01-23 PROCEDURE — 1036F TOBACCO NON-USER: CPT | Performed by: ANESTHESIOLOGY

## 2024-01-23 RX ORDER — LORAZEPAM 2 MG/1
TABLET ORAL
Qty: 1 TABLET | Refills: 0 | Status: SHIPPED | OUTPATIENT
Start: 2024-01-23

## 2024-01-23 ASSESSMENT — ENCOUNTER SYMPTOMS
OCCASIONAL FEELINGS OF UNSTEADINESS: 0
LOSS OF SENSATION IN FEET: 0
CARDIOVASCULAR NEGATIVE: 1
RESPIRATORY NEGATIVE: 1
PSYCHIATRIC NEGATIVE: 1
NECK PAIN: 1
MYALGIAS: 1
ENDOCRINE NEGATIVE: 1
DEPRESSION: 0
HEMATOLOGIC/LYMPHATIC NEGATIVE: 1
EYES NEGATIVE: 1
GASTROINTESTINAL NEGATIVE: 1

## 2024-01-23 ASSESSMENT — LIFESTYLE VARIABLES
SKIP TO QUESTIONS 9-10: 1
HOW OFTEN DURING THE LAST YEAR HAVE YOU BEEN UNABLE TO REMEMBER WHAT HAPPENED THE NIGHT BEFORE BECAUSE YOU HAD BEEN DRINKING: NEVER
HOW OFTEN DURING THE LAST YEAR HAVE YOU HAD A FEELING OF GUILT OR REMORSE AFTER DRINKING: NEVER
HOW OFTEN DO YOU HAVE SIX OR MORE DRINKS ON ONE OCCASION: NEVER
HAVE YOU OR SOMEONE ELSE BEEN INJURED AS A RESULT OF YOUR DRINKING: NO
AUDIT TOTAL SCORE: 0
HOW OFTEN DURING THE LAST YEAR HAVE YOU NEEDED AN ALCOHOLIC DRINK FIRST THING IN THE MORNING TO GET YOURSELF GOING AFTER A NIGHT OF HEAVY DRINKING: NEVER
HOW OFTEN DO YOU HAVE A DRINK CONTAINING ALCOHOL: NEVER
HAS A RELATIVE, FRIEND, DOCTOR, OR ANOTHER HEALTH PROFESSIONAL EXPRESSED CONCERN ABOUT YOUR DRINKING OR SUGGESTED YOU CUT DOWN: NO
HOW MANY STANDARD DRINKS CONTAINING ALCOHOL DO YOU HAVE ON A TYPICAL DAY: PATIENT DOES NOT DRINK
HOW OFTEN DURING THE LAST YEAR HAVE YOU FAILED TO DO WHAT WAS NORMALLY EXPECTED FROM YOU BECAUSE OF DRINKING: NEVER
HOW OFTEN DURING THE LAST YEAR HAVE YOU FOUND THAT YOU WERE NOT ABLE TO STOP DRINKING ONCE YOU HAD STARTED: NEVER
AUDIT-C TOTAL SCORE: 0

## 2024-01-23 ASSESSMENT — PAIN DESCRIPTION - DESCRIPTORS: DESCRIPTORS: OTHER (COMMENT)

## 2024-01-23 ASSESSMENT — PAIN SCALES - GENERAL
PAINLEVEL: 7
PAINLEVEL_OUTOF10: 7

## 2024-01-23 ASSESSMENT — PAIN - FUNCTIONAL ASSESSMENT: PAIN_FUNCTIONAL_ASSESSMENT: 0-10

## 2024-01-23 NOTE — PROGRESS NOTES
This is 28 y.o.  female with who has been treated for   between shoulder pain right shoulder and arm pain and hand numbness with pain  .  Patient also describes having neck pinching that is getting worse, The pain is described as  pinching numbing and painful limited with ability in her fingers  and is relieved by nothing . Here for follow-up .  Chief Complaint   Patient presents with    Follow-up     12/18/2023 bilateral L1-2 interlaminar SHEKHAR        Pain Therapies: Injections

## 2024-01-31 ENCOUNTER — HOSPITAL ENCOUNTER (OUTPATIENT)
Dept: NEUROLOGY | Facility: CLINIC | Age: 29
Discharge: HOME | End: 2024-01-31
Payer: COMMERCIAL

## 2024-01-31 DIAGNOSIS — M47.22 CERVICAL SPONDYLOSIS WITH RADICULOPATHY: ICD-10-CM

## 2024-01-31 DIAGNOSIS — G56.01 CARPAL TUNNEL SYNDROME OF RIGHT WRIST: ICD-10-CM

## 2024-01-31 PROCEDURE — 95911 NRV CNDJ TEST 9-10 STUDIES: CPT | Performed by: PSYCHIATRY & NEUROLOGY

## 2024-01-31 PROCEDURE — 95886 MUSC TEST DONE W/N TEST COMP: CPT | Performed by: PSYCHIATRY & NEUROLOGY

## 2024-02-01 ENCOUNTER — APPOINTMENT (OUTPATIENT)
Dept: PAIN MEDICINE | Facility: CLINIC | Age: 29
End: 2024-02-01
Payer: COMMERCIAL

## 2024-02-05 ENCOUNTER — HOSPITAL ENCOUNTER (OUTPATIENT)
Dept: RADIOLOGY | Facility: CLINIC | Age: 29
Discharge: HOME | End: 2024-02-05
Payer: COMMERCIAL

## 2024-02-05 ENCOUNTER — HOSPITAL ENCOUNTER (OUTPATIENT)
Dept: PAIN MEDICINE | Facility: CLINIC | Age: 29
Discharge: HOME | End: 2024-02-05
Payer: COMMERCIAL

## 2024-02-05 VITALS
OXYGEN SATURATION: 100 % | DIASTOLIC BLOOD PRESSURE: 80 MMHG | HEART RATE: 106 BPM | TEMPERATURE: 97.3 F | RESPIRATION RATE: 16 BRPM | SYSTOLIC BLOOD PRESSURE: 123 MMHG

## 2024-02-05 DIAGNOSIS — M79.7 FIBROMYALGIA: ICD-10-CM

## 2024-02-05 DIAGNOSIS — G56.01 CARPAL TUNNEL SYNDROME OF RIGHT WRIST: ICD-10-CM

## 2024-02-05 DIAGNOSIS — M47.22 CERVICAL SPONDYLOSIS WITH RADICULOPATHY: ICD-10-CM

## 2024-02-05 DIAGNOSIS — M41.115 JUVENILE IDIOPATHIC SCOLIOSIS OF THORACOLUMBAR REGION: ICD-10-CM

## 2024-02-05 LAB — PREGNANCY TEST URINE, POC: NEGATIVE

## 2024-02-05 PROCEDURE — 2500000005 HC RX 250 GENERAL PHARMACY W/O HCPCS

## 2024-02-05 PROCEDURE — 62321 NJX INTERLAMINAR CRV/THRC: CPT | Performed by: ANESTHESIOLOGY

## 2024-02-05 PROCEDURE — 77003 FLUOROGUIDE FOR SPINE INJECT: CPT

## 2024-02-05 PROCEDURE — 2500000004 HC RX 250 GENERAL PHARMACY W/ HCPCS (ALT 636 FOR OP/ED)

## 2024-02-05 RX ORDER — METHYLPREDNISOLONE ACETATE 80 MG/ML
INJECTION, SUSPENSION INTRA-ARTICULAR; INTRALESIONAL; INTRAMUSCULAR; SOFT TISSUE
Status: COMPLETED
Start: 2024-02-05 | End: 2024-02-05

## 2024-02-05 RX ORDER — LIDOCAINE HYDROCHLORIDE 5 MG/ML
INJECTION, SOLUTION INFILTRATION; INTRAVENOUS
Status: COMPLETED
Start: 2024-02-05 | End: 2024-02-05

## 2024-02-05 RX ORDER — LIDOCAINE HYDROCHLORIDE AND EPINEPHRINE 10; 10 MG/ML; UG/ML
INJECTION, SOLUTION INFILTRATION; PERINEURAL
Status: COMPLETED
Start: 2024-02-05 | End: 2024-02-05

## 2024-02-05 RX ADMIN — LIDOCAINE HYDROCHLORIDE,EPINEPHRINE BITARTRATE 20 ML: 10; .01 INJECTION, SOLUTION INFILTRATION; PERINEURAL at 08:34

## 2024-02-05 RX ADMIN — METHYLPREDNISOLONE ACETATE 80 MG: 80 INJECTION, SUSPENSION INTRA-ARTICULAR; INTRALESIONAL; INTRAMUSCULAR; SOFT TISSUE at 08:34

## 2024-02-05 RX ADMIN — LIDOCAINE HYDROCHLORIDE 250 MG: 5 INJECTION, SOLUTION INFILTRATION at 08:34

## 2024-02-05 ASSESSMENT — ENCOUNTER SYMPTOMS
LOSS OF SENSATION IN FEET: 0
OCCASIONAL FEELINGS OF UNSTEADINESS: 0
DEPRESSION: 0

## 2024-02-05 ASSESSMENT — COLUMBIA-SUICIDE SEVERITY RATING SCALE - C-SSRS
1. IN THE PAST MONTH, HAVE YOU WISHED YOU WERE DEAD OR WISHED YOU COULD GO TO SLEEP AND NOT WAKE UP?: NO
2. HAVE YOU ACTUALLY HAD ANY THOUGHTS OF KILLING YOURSELF?: NO
6. HAVE YOU EVER DONE ANYTHING, STARTED TO DO ANYTHING, OR PREPARED TO DO ANYTHING TO END YOUR LIFE?: NO

## 2024-02-05 ASSESSMENT — PAIN SCALES - GENERAL
PAINLEVEL_OUTOF10: 3
PAINLEVEL_OUTOF10: 8

## 2024-02-05 ASSESSMENT — PAIN - FUNCTIONAL ASSESSMENT: PAIN_FUNCTIONAL_ASSESSMENT: 0-10

## 2024-02-05 NOTE — OP NOTE
Patient has no changes in health medical management or allergies since last visit on 1/23/2024    PRE-OPERATIVE DIAGNOSIS: Cervical spondylosis with radiculopathy     POST-OPERATIVE DIAGNOSIS:  The same.  ESTIMATED BLOOD LOSS:  None.  Complications: None    PROCEDURE: bilateralC6-7Cervical Epidural.      COMPLICATIONS:  None    INDICATIONS: This patient is a pleasant 28 y.o.  female with a significant history of Cervical pain with radiculopathy Physical exam and Radiological findings correlate with the pre-operative diagnoses.  Because of these findings we elected to proceed with cervical epidural steroid injection at the affected level.     PROCEDURE: After sufficient consent was signed, the patient was brought to the Operating Room, and placed in a prone position with a pillow underneath the chest. The patient's neck was in a flexed position. The neck and upper thoracic area were then prepped and draped in the usual fashion.     Under fluoroscopic guidance, the  bilateralC6-7 interspace was identified. The skin at the entry site was infiltrated with 1% Lidocaine using a size 25 gauge needle. A Touhy needle 18G, 3.5 inch was introduced gradually until the inferior lamina of the interspace was encountered. The needle was gradually advanced until it entered into the ligament. At that time, loss of resistance technique was used, and the needle advanced gradually until the negative pressure was noticed in the epidural space. Next 1cc of Omnipaque 300 were injected. This revealed excellent distribution of the dye in the epidural space, and around the nerve roots of the affected side of the neck and no vascular involvement. At that time, a mixture of Depo-Medrol 80 mg mg, Lidocaine 1% + Epi 1/100.000, 20 mg  were injected . The needle was flushed and removed.     The patient tolerated the procedure very well and was transferred to the Recovery Room in stable condition.  The patient had stable resolution of symptoms.   Patient to follow-up in the Pain Management Clinic as scheduled.

## 2024-02-05 NOTE — Clinical Note
Prepped with ChloraPrep, a minimum of 3 minute dry time, longer if needed, no pooling noted, patient draped in sterile fashion. Neck

## 2024-02-29 ENCOUNTER — TELEPHONE (OUTPATIENT)
Dept: PAIN MEDICINE | Facility: CLINIC | Age: 29
End: 2024-02-29
Payer: COMMERCIAL

## 2024-02-29 DIAGNOSIS — M50.30 DDD (DEGENERATIVE DISC DISEASE), CERVICAL: Primary | ICD-10-CM

## 2024-02-29 NOTE — TELEPHONE ENCOUNTER
----- Message from Charlette Panchal sent at 2/29/2024  8:32 AM EST -----  Can you please enter an order for the merari that is scheduled for 3/5/24?  Thank you

## 2024-03-12 NOTE — PROGRESS NOTES
SUBJECTIVE:  This is 29 y.o.  female with PMH of ADHD on Vyvanse 60 mg scoliosis, bruising and gum bleeding she has been evaluated at [facility] with normal platelet count and function, PTT, INR, and negative von Willebrand panel.  Who has significant axial spinal pain from her cervical to sacrum.  The patient has some scoliosis and received bilateral L1-2 interlaminar SHEKHAR without benefit.  Patient received bilateral C6-7 interlaminar SHEKHAR because of degenerative changes and mild stenosis at the C4-5 and C5-6 on 2/5/2024.  The patient is planning on having children soon who is here for follow-up ***    I think the patient has fibromyalgia and she should start IV infusion therapy as long as she is not getting get pregnant during the treatment period.    Prior office visit:  1/23/2024: This is 28 y.o.  female with PMH of scoliosis, bruising and gum bleeding she has been evaluated at [facility] with normal platelet count and function, PTT, INR, and negative von Willebrand panel.  The patient had significant spinal pain worse in the mid thoracic and lower thoracic area received bilateral L1-2 interlaminar SHEKHAR on 12/18/2023 who is here for follow-up discussing the pain in her neck and the tingling in her right thumb.  Carpal tunnel maneuver caused some increased tingling in the thumb.  I will order an EMG on her to differentiate between radiculopathy and carpal tunnel.  Her MRI results from [facility] showed very mild foraminal stenosis does not explain radiculitis.  The patient stated that when she does neck traction that released the pressure on her thumb and that the tingling goes away.  We are going to proceed with cervical epidural steroid injection on the right side at C6-7 after lorazepam and covering her pelvis with a C arm down to prevent radiation to her ovaries.  Also I discussed with her that the lorazepam is not a medication she can get pregnant while she is taking it.            Procedures:   2/5/2024 bilateral C6-7  interlaminar SHEKHAR patient has had a ***% improvement in pain and function  12/18/2023 bilateral L1-2 interlaminar SHEKHAR the patient has had a 0% improvement in pain and function     Portions of record reviewed for pertinent issues: active problem list, medication list, allergies, family history, social history, notes from last encounter, encounters, lab results, imaging and other available records.     I have personally reviewed the OARRS report for this patient. This report is scanned into the electronic medical record. I have considered the risks of abuse, dependence, addiction and diversion. It showed: No chronic opioid therapy, Vyvanse 60 mg daily from Cuco Parada DO   OPIOID RISK ASSESSMENT SCORE 6/26  Aberrant behavior: None        Diagnostic studies:  9/27/2023 MRI of the cervical spine at [facility] report showed mild stenosis at C4-5 to C6-7 otherwise normal MRI:  Mild cervical degenerative disc disease with mild canal stenosis at C4-5   through C6-7 as detailed above.  Otherwise normal study.  No evidence of   cord compression or intrinsic abnormality of the upper spinal cord by MRI.     Anatomic Variant:  None.  Assume 7 cervical vertebrae with counting from   the craniocervical junction.            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      Social History:  planning on having children and we discussed that and the impact of therapy on getting pregnant, finished masters degree in education denies smoking drinking or use of illicit drugs        Procedures:  *** the patient has had a ***% improvement in pain and function      Portions of record reviewed for pertinent issues: active problem list, medication list, allergies, family history, social history, notes from last encounter, encounters, lab results, imaging and other available records.      I have personally reviewed the OARRS report for this patient. This report is scanned into  the electronic medical record. I have considered the risks of abuse, dependence, addiction and diversion. It showed: ***  OPIOID RISK ASSESSMENT SCORE ***/26  Opioid agreement: ***  Activities of daily living: ***  Adverse effects: ***  Analgesia: W/O: ***/10, W ***/10    Toxicology screen: ***  Aberrant behavior: ***        Review of Systems     Physical Exam                 Plan  At least 50% of the visit was involved in the discussion of the options for treatment. We discussed exercises, medication, interventional therapies and surgery. Healthy life style is essential with patient hard work to achieve the wellness. In addition; discussion with the patient and/or family about any of the diagnostic results, impressions and/or recommended diagnostic studies, prognosis, risks and benefits of treatment options, instructions for treatment and/or follow-up, importance of compliance with chosen treatment options, risk-factor reduction, and patient/family education.         Pool therapy, walking in the pool, at least 3x per week for 30 minutes  Continue self-directed physical therapy  *** Smoking cessation  Healthy lifestyle and anti-inflammatory diet in addition to weight control discussed with the patient  Alternative chronic pain therapies was discussed, encouraged and information was handed  Return to Clinic ***     *Please note this report has been produced using speech recognition software and may contain errors related to that system including grammar, punctuation and spelling as well as words and phrases that may be inappropriate. If there are questions or concerns, please feel free to contact me to clarify.    Slime Fu MD

## 2024-03-15 ENCOUNTER — OFFICE VISIT (OUTPATIENT)
Dept: PAIN MEDICINE | Facility: CLINIC | Age: 29
End: 2024-03-15
Payer: COMMERCIAL

## 2024-03-15 ENCOUNTER — APPOINTMENT (OUTPATIENT)
Dept: PAIN MEDICINE | Facility: CLINIC | Age: 29
End: 2024-03-15
Payer: COMMERCIAL

## 2024-03-15 VITALS
WEIGHT: 170 LBS | HEIGHT: 64 IN | HEART RATE: 101 BPM | BODY MASS INDEX: 29.02 KG/M2 | OXYGEN SATURATION: 98 % | SYSTOLIC BLOOD PRESSURE: 125 MMHG | DIASTOLIC BLOOD PRESSURE: 76 MMHG | RESPIRATION RATE: 16 BRPM | TEMPERATURE: 98.2 F

## 2024-03-15 DIAGNOSIS — M79.7 FIBROMYALGIA: Primary | ICD-10-CM

## 2024-03-15 DIAGNOSIS — M41.115 JUVENILE IDIOPATHIC SCOLIOSIS OF THORACOLUMBAR REGION: ICD-10-CM

## 2024-03-15 PROCEDURE — 1036F TOBACCO NON-USER: CPT | Performed by: ANESTHESIOLOGY

## 2024-03-15 PROCEDURE — 99214 OFFICE O/P EST MOD 30 MIN: CPT | Performed by: ANESTHESIOLOGY

## 2024-03-15 SDOH — ECONOMIC STABILITY: FOOD INSECURITY: WITHIN THE PAST 12 MONTHS, YOU WORRIED THAT YOUR FOOD WOULD RUN OUT BEFORE YOU GOT MONEY TO BUY MORE.: NEVER TRUE

## 2024-03-15 SDOH — ECONOMIC STABILITY: FOOD INSECURITY: WITHIN THE PAST 12 MONTHS, THE FOOD YOU BOUGHT JUST DIDN'T LAST AND YOU DIDN'T HAVE MONEY TO GET MORE.: NEVER TRUE

## 2024-03-15 ASSESSMENT — ENCOUNTER SYMPTOMS
LOSS OF SENSATION IN FEET: 0
RESPIRATORY NEGATIVE: 1
DEPRESSION: 0
EYES NEGATIVE: 1
CARDIOVASCULAR NEGATIVE: 1
MYALGIAS: 1
PSYCHIATRIC NEGATIVE: 1
OCCASIONAL FEELINGS OF UNSTEADINESS: 0
ENDOCRINE NEGATIVE: 1
GASTROINTESTINAL NEGATIVE: 1
NECK PAIN: 1
HEMATOLOGIC/LYMPHATIC NEGATIVE: 1

## 2024-03-15 ASSESSMENT — PAIN SCALES - GENERAL: PAINLEVEL: 5

## 2024-03-15 NOTE — PROGRESS NOTES
SUBJECTIVE:  This is 29 y.o.  female with PMH of ADHD on Vyvanse 60 mg daily, BMI 29, scoliosis, bruising and gum bleeding she has been evaluated at [facility] with normal platelet count and function, PTT, INR, and negative von Willebrand panel, EMG was done on 1/31/2020 patient received bilateral C6-7 interlaminar SHEKHAR 2/5/2023 who is here for follow-up stating that she had great relief of her symptoms and improvement with her numbness till yesterday and she started having pain yesterday.  The patient had on 1/31/2024 EMG showed mild carpal tunnel and she told me today that she does a lot of needle work ricardo which may be contributing to her numbness in her hand.  I explained to her that she had to wear her wrist splints every night.  We discussed her symptoms and I really lean toward fibromyalgia and we are going to start her on IV infusion therapy once every 2 weeks and we discussed pregnancy and she cannot get pregnant while she is getting the infusion.  We do pregnancy test every time prior to the infusion anyway.         Prior office visit:  1/23/2024: 2/2/2024 addendum:  1/31/2024.  EMG done by Dr. Gayla Howard MD showed carpal tunnel syndrome no signs of radiculopathy     SUBJECTIVE:  This is 28 y.o.  female with PMH of scoliosis, bruising and gum bleeding she has been evaluated at [facility] with normal platelet count and function, PTT, INR, and negative von Willebrand panel.  The patient had significant spinal pain worse in the mid thoracic and lower thoracic area received bilateral L1-2 interlaminar SHEKHAR on 12/18/2023 who is here for follow-up discussing the pain in her neck and the tingling in her right thumb.  Carpal tunnel maneuver caused some increased tingling in the thumb.  I will order an EMG on her to differentiate between radiculopathy and carpal tunnel.  Her MRI results from [facility] showed very mild foraminal stenosis does not explain radiculitis.  The patient stated that when she does neck  traction that released the pressure on her thumb and that the tingling goes away.  We are going to proceed with cervical epidural steroid injection on the right side at C6-7 after lorazepam and covering her pelvis with a C arm down to prevent radiation to her ovaries.  Also I discussed with her that the lorazepam is not a medication she can get pregnant while she is taking it.        Prior office visit:  12/6/2023: The patient was referred to us by Referring Provider: Self-referral. this is 28 y.o.  female with a past history of history of scoliosis, bruising and gum bleeding she has been evaluated at [facility] with normal platelet count and function, PTT, INR, and negative von Willebrand panel.  The patient is here because of almost total spine pain but the worst is in the thoracic spine.  Patient has no paralysis no paresthesias no incontinence.  The pain is localized to axial areas.  She never had any scoliosis evaluation by surgery.  And I do not see any x-rays of her scoliosis.  The patient has no red flags.           Procedures:   2/5/2024 bilateral C6-7 interlaminar SHEKHAR patient has had 100% improvement in pain and function 5 weeks  12/18/2023 bilateral L1-2 interlaminar SHEKHAR the patient has had a 0% improvement in pain and function     Portions of record reviewed for pertinent issues: active problem list, medication list, allergies, family history, social history, notes from last encounter, encounters, lab results, imaging and other available records.     I have personally reviewed the OARRS report for this patient. This report is scanned into the electronic medical record. I have considered the risks of abuse, dependence, addiction and diversion. It showed: No chronic opioid therapy, Vyvanse 60 mg  OPIOID RISK ASSESSMENT SCORE 6/26  Aberrant behavior: None        Diagnostic studies:  9/27/2023 MRI of the cervical spine at [facility] report showed mild stenosis at C4-5 to C6-7 otherwise normal MRI:  Mild cervical  degenerative disc disease with mild canal stenosis at C4-5   through C6-7 as detailed above.  Otherwise normal study.  No evidence of   cord compression or intrinsic abnormality of the upper spinal cord by MRI.     Anatomic Variant:  None.  Assume 7 cervical vertebrae with counting from   the craniocervical junction.            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      Social History:  planning on having children and we discussed that and the impact of therapy on getting pregnant, finished masters degree in education denies smoking drinking or use of illicit drugs         Review of Systems   HENT: Negative.     Eyes: Negative.    Respiratory: Negative.     Cardiovascular: Negative.    Gastrointestinal: Negative.    Endocrine: Negative.    Genitourinary: Negative.    Musculoskeletal:  Positive for myalgias and neck pain.   Skin: Negative.    Neurological:         Numbness in hands   Hematological: Negative.    Psychiatric/Behavioral: Negative.          Physical Exam  Vitals and nursing note reviewed.   Constitutional:       Appearance: Normal appearance.   HENT:      Head: Normocephalic and atraumatic.      Nose: Nose normal.   Eyes:      Extraocular Movements: Extraocular movements intact.      Conjunctiva/sclera: Conjunctivae normal.      Pupils: Pupils are equal, round, and reactive to light.   Cardiovascular:      Rate and Rhythm: Normal rate and regular rhythm.      Pulses: Normal pulses.      Heart sounds: Normal heart sounds.   Pulmonary:      Effort: Pulmonary effort is normal.      Breath sounds: Normal breath sounds.   Abdominal:      General: Abdomen is flat. Bowel sounds are normal.      Palpations: Abdomen is soft.   Musculoskeletal:         General: Tenderness present.   Skin:     General: Skin is warm.   Neurological:      General: No focal deficit present.      Mental Status: She is alert and oriented to person, place, and  time.   Psychiatric:         Mood and Affect: Mood normal.         Behavior: Behavior normal.                      Plan  At least 50% of the visit was involved in the discussion of the options for treatment. We discussed exercises, medication, interventional therapies and surgery. Healthy life style is essential with patient hard work to achieve the wellness. In addition; discussion with the patient and/or family about any of the diagnostic results, impressions and/or recommended diagnostic studies, prognosis, risks and benefits of treatment options, instructions for treatment and/or follow-up, importance of compliance with chosen treatment options, risk-factor reduction, and patient/family education.         Pool therapy, walking in the pool, at least 3x per week for 30 minutes  Continue self-directed physical therapy  IV infusion therapy once every 2 weeks information was given to the patient  Consider carpal tunnel injection if the symptoms became worse  Healthy lifestyle and anti-inflammatory diet in addition to weight control discussed with the patient  Alternative chronic pain therapies was discussed, encouraged and information was handed  Return to Clinic 2 to 3 months     *Please note this report has been produced using speech recognition software and may contain errors related to that system including grammar, punctuation and spelling as well as words and phrases that may be inappropriate. If there are questions or concerns, please feel free to contact me to clarify.    Slime Fu MD

## 2024-03-15 NOTE — PROGRESS NOTES
This is 29 y.o.  female with who has been treated for Cervical pain  numbness in her had is bad , making hard for her to sleep.. Pain is better, after the injection .Patient had 70 % pain relief until just yesterday .The pain is described as  stiffness and aching  and is relieved by the cervical injection. Here for follow-up .  Patient also states that she has pain in between her shoulder blades.  Chief Complaint   Patient presents with    Follow-up     2/5/2024 bilateral C6-7 interlaminar SHEKHAR       Pain Therapies: Injections

## 2024-06-03 ENCOUNTER — APPOINTMENT (OUTPATIENT)
Dept: INFUSION THERAPY | Facility: CLINIC | Age: 29
End: 2024-06-03
Payer: COMMERCIAL

## 2024-08-28 ENCOUNTER — OFFICE VISIT (OUTPATIENT)
Dept: OBSTETRICS AND GYNECOLOGY | Facility: CLINIC | Age: 29
End: 2024-08-28
Payer: COMMERCIAL

## 2024-08-28 VITALS
BODY MASS INDEX: 29.71 KG/M2 | SYSTOLIC BLOOD PRESSURE: 120 MMHG | WEIGHT: 174 LBS | DIASTOLIC BLOOD PRESSURE: 80 MMHG | HEIGHT: 64 IN

## 2024-08-28 DIAGNOSIS — Z31.69 INFERTILITY COUNSELING: Primary | ICD-10-CM

## 2024-08-28 PROCEDURE — 3008F BODY MASS INDEX DOCD: CPT | Performed by: ADVANCED PRACTICE MIDWIFE

## 2024-08-28 PROCEDURE — 1036F TOBACCO NON-USER: CPT | Performed by: ADVANCED PRACTICE MIDWIFE

## 2024-08-28 PROCEDURE — 99212 OFFICE O/P EST SF 10 MIN: CPT | Performed by: ADVANCED PRACTICE MIDWIFE

## 2024-08-28 PROCEDURE — 99202 OFFICE O/P NEW SF 15 MIN: CPT | Performed by: ADVANCED PRACTICE MIDWIFE

## 2024-08-28 ASSESSMENT — ENCOUNTER SYMPTOMS
LOSS OF SENSATION IN FEET: 0
RESPIRATORY NEGATIVE: 0
CONSTITUTIONAL NEGATIVE: 0
MUSCULOSKELETAL NEGATIVE: 0
CARDIOVASCULAR NEGATIVE: 0
ALLERGIC/IMMUNOLOGIC NEGATIVE: 0
GASTROINTESTINAL NEGATIVE: 1
PSYCHIATRIC NEGATIVE: 0
RESPIRATORY NEGATIVE: 1
ENDOCRINE NEGATIVE: 0
DEPRESSION: 0
FEVER: 0
OCCASIONAL FEELINGS OF UNSTEADINESS: 0
UNEXPECTED WEIGHT CHANGE: 0
HEMATOLOGIC/LYMPHATIC NEGATIVE: 0
CHILLS: 0
EYES NEGATIVE: 0
FATIGUE: 0
GASTROINTESTINAL NEGATIVE: 0
ACTIVITY CHANGE: 0
NEUROLOGICAL NEGATIVE: 0

## 2024-08-28 ASSESSMENT — LIFESTYLE VARIABLES
AUDIT-C TOTAL SCORE: 0
HOW OFTEN DO YOU HAVE A DRINK CONTAINING ALCOHOL: NEVER
HOW MANY STANDARD DRINKS CONTAINING ALCOHOL DO YOU HAVE ON A TYPICAL DAY: PATIENT DOES NOT DRINK
HOW OFTEN DO YOU HAVE SIX OR MORE DRINKS ON ONE OCCASION: NEVER
SKIP TO QUESTIONS 9-10: 1

## 2024-08-28 ASSESSMENT — PAIN SCALES - GENERAL: PAINLEVEL: 0-NO PAIN

## 2024-08-28 NOTE — PROGRESS NOTES
Subjective   Patient ID: Morena Franco is a 29 y.o. female who presents for New Patient Visit (New patient visit/fertility ).  Pt is here to discuss infertility. She has been actively trying to conceive for 2 years. She reports regular menses every 28 days with bleeding lasting 4-5 days. No dysmenorrhea or menorrhagia. No intermenstrual bleeding. She denies post coital bleeding. No pain with intercourse. No abnormal vaginal discharge or pelvic pain. No H/O thyroid disease.     She reports she had been tracking ovulation previously but is not at this time.         Review of Systems   Constitutional:  Negative for activity change, chills, fatigue, fever and unexpected weight change.   Respiratory: Negative.     Gastrointestinal: Negative.    Genitourinary: Negative.    Skin: Negative.        Objective   Physical Exam  Constitutional:       Appearance: Normal appearance.   Cardiovascular:      Rate and Rhythm: Normal rate and regular rhythm.   Pulmonary:      Effort: Pulmonary effort is normal.   Abdominal:      General: Abdomen is flat. There is no distension.      Palpations: Abdomen is soft. There is no mass.      Tenderness: There is no abdominal tenderness. There is no guarding or rebound.      Hernia: No hernia is present.   Musculoskeletal:         General: Normal range of motion.      Cervical back: Normal range of motion.   Skin:     General: Skin is warm and dry.   Neurological:      General: No focal deficit present.      Mental Status: She is alert and oriented to person, place, and time. Mental status is at baseline.   Psychiatric:         Mood and Affect: Mood normal.         Behavior: Behavior normal.         Thought Content: Thought content normal.         Judgment: Judgment normal.         Assessment/Plan   Diagnoses and all orders for this visit:  Infertility counseling  -     Referral to Reproductive Endocrinology; Future    Preconception counseling done. Pt to begin a prenatal vitamin. Will continue  to avoid alcohol and drug intake. Discussed importance of a healthy diet and exercise routine and maintenance of a healthy weight.     Discussed timing of intercourse with a normal regular cycle as well as signs and symptoms of ovulation.          ULISES Vital 08/28/24 11:39 AM

## 2024-09-11 ENCOUNTER — OFFICE VISIT (OUTPATIENT)
Dept: OBSTETRICS AND GYNECOLOGY | Facility: CLINIC | Age: 29
End: 2024-09-11
Payer: COMMERCIAL

## 2024-09-11 VITALS
HEIGHT: 64 IN | SYSTOLIC BLOOD PRESSURE: 124 MMHG | BODY MASS INDEX: 29.19 KG/M2 | WEIGHT: 171 LBS | DIASTOLIC BLOOD PRESSURE: 79 MMHG

## 2024-09-11 DIAGNOSIS — Z01.419 WOMEN'S ANNUAL ROUTINE GYNECOLOGICAL EXAMINATION: Primary | ICD-10-CM

## 2024-09-11 PROBLEM — B37.31 YEAST VAGINITIS: Status: RESOLVED | Noted: 2023-12-14 | Resolved: 2024-09-11

## 2024-09-11 PROCEDURE — 3008F BODY MASS INDEX DOCD: CPT | Performed by: ADVANCED PRACTICE MIDWIFE

## 2024-09-11 PROCEDURE — 99395 PREV VISIT EST AGE 18-39: CPT | Performed by: ADVANCED PRACTICE MIDWIFE

## 2024-09-11 PROCEDURE — 1036F TOBACCO NON-USER: CPT | Performed by: ADVANCED PRACTICE MIDWIFE

## 2024-09-11 ASSESSMENT — ENCOUNTER SYMPTOMS
ENDOCRINE NEGATIVE: 0
DEPRESSION: 0
RESPIRATORY NEGATIVE: 0
HEMATOLOGIC/LYMPHATIC NEGATIVE: 0
LOSS OF SENSATION IN FEET: 0
GASTROINTESTINAL NEGATIVE: 0
MUSCULOSKELETAL NEGATIVE: 0
ALLERGIC/IMMUNOLOGIC NEGATIVE: 0
CARDIOVASCULAR NEGATIVE: 0
OCCASIONAL FEELINGS OF UNSTEADINESS: 0
NEUROLOGICAL NEGATIVE: 0
EYES NEGATIVE: 0
PSYCHIATRIC NEGATIVE: 0
CONSTITUTIONAL NEGATIVE: 0

## 2024-09-11 ASSESSMENT — PAIN SCALES - GENERAL: PAINLEVEL: 0-NO PAIN

## 2024-09-11 NOTE — PROGRESS NOTES
"Assessment/Plan   Diagnoses and all orders for this visit:  Women's annual routine gynecological examination      ULISES Vital     Subjective   Morena Franco is a 29 y.o. female who is here for a routine exam.     Concerns today:  Discussed infertility and referral to reproductive endocrinology. Pt is scheduled for December.     Patient's last menstrual period was 2024.   Periods are regular every 28-30 days, lasting 3 days.   Dysmenorrhea:mild, occurring premenstrually.   Cyclic symptoms include changes in libido and moodiness.     Sexual Activity: sexually active, male partners; Patient reports 1 partners in the last 12 months.  Pain with intercourse? No   Loss of desire? No       History of prior STI: none    Current contraception: none    Last pap:   History of abnormal Pap smear: no  Family history of uterine or ovarian cancer: no    Last mammogram: none  History of abnormal mammogram: no  Family history of breast cancer: no  Menstrual History:  OB History          0    Para   0    Term   0       0    AB   0    Living   0         SAB   0    IAB   0    Ectopic   0    Multiple   0    Live Births   0                Patient's last menstrual period was 2024.       Objective   /79   Ht 1.626 m (5' 4\")   Wt 77.6 kg (171 lb)   LMP 2024   BMI 29.35 kg/m²   Physical Exam  Constitutional:       Appearance: Normal appearance.   Genitourinary:      Rectum normal.      No lesions in the vagina.      Right Labia: No rash, tenderness, lesions, skin changes or Bartholin's cyst.     Left Labia: No tenderness, lesions, skin changes, Bartholin's cyst or rash.     No vaginal discharge, erythema, tenderness, bleeding or ulceration.      No vaginal prolapse present.     No vaginal atrophy present.       Right Adnexa: not tender, not full and no mass present.     Left Adnexa: not tender, not full and no mass present.     No cervical motion tenderness, discharge, friability, " lesion or polyp.      Uterus is not enlarged, fixed or tender.      No uterine mass detected.     Uterus is retroverted.   Breasts:     Breasts are soft.     Right: Normal.   Cardiovascular:      Rate and Rhythm: Normal rate and regular rhythm.   Pulmonary:      Breath sounds: Normal breath sounds.   Abdominal:      General: Abdomen is flat. Bowel sounds are normal.      Palpations: Abdomen is soft.   Musculoskeletal:         General: Normal range of motion.      Cervical back: Normal range of motion.   Neurological:      General: No focal deficit present.      Mental Status: She is alert and oriented to person, place, and time. Mental status is at baseline.   Skin:     General: Skin is warm and dry.   Psychiatric:         Mood and Affect: Mood normal.         Behavior: Behavior normal.         Judgment: Judgment normal.        Annual exam in 1 year     ULISES Vital

## 2024-10-07 ENCOUNTER — ANCILLARY PROCEDURE (OUTPATIENT)
Dept: ENDOCRINOLOGY | Facility: CLINIC | Age: 29
End: 2024-10-07
Payer: COMMERCIAL

## 2024-10-07 VITALS
BODY MASS INDEX: 29.37 KG/M2 | HEART RATE: 114 BPM | SYSTOLIC BLOOD PRESSURE: 117 MMHG | DIASTOLIC BLOOD PRESSURE: 77 MMHG | HEIGHT: 64 IN | WEIGHT: 172 LBS

## 2024-10-07 DIAGNOSIS — Z11.59 ENCOUNTER FOR SCREENING FOR OTHER VIRAL DISEASES: ICD-10-CM

## 2024-10-07 DIAGNOSIS — N91.5 OLIGOMENORRHEA, UNSPECIFIED TYPE: ICD-10-CM

## 2024-10-07 DIAGNOSIS — Z01.83 ENCOUNTER FOR RH BLOOD TYPING: ICD-10-CM

## 2024-10-07 DIAGNOSIS — N97.9 FEMALE INFERTILITY: ICD-10-CM

## 2024-10-07 DIAGNOSIS — N93.9 ABNORMAL UTERINE BLEEDING: ICD-10-CM

## 2024-10-07 DIAGNOSIS — Z31.41 FERTILITY TESTING: Primary | ICD-10-CM

## 2024-10-07 DIAGNOSIS — Z11.3 SCREENING FOR STDS (SEXUALLY TRANSMITTED DISEASES): ICD-10-CM

## 2024-10-07 DIAGNOSIS — Z13.29 SCREENING FOR THYROID DISORDER: ICD-10-CM

## 2024-10-07 DIAGNOSIS — Z13.1 SCREENING FOR DIABETES MELLITUS: ICD-10-CM

## 2024-10-07 DIAGNOSIS — Z13.71 SCREENING FOR GENETIC DISEASE CARRIER STATUS: ICD-10-CM

## 2024-10-07 DIAGNOSIS — Z31.69 INFERTILITY COUNSELING: ICD-10-CM

## 2024-10-07 DIAGNOSIS — Z01.812 ENCOUNTER FOR PREPROCEDURAL LABORATORY EXAMINATION: ICD-10-CM

## 2024-10-07 LAB
ABO GROUP (TYPE) IN BLOOD: NORMAL
ANTIBODY SCREEN: NORMAL
EST. AVERAGE GLUCOSE BLD GHB EST-MCNC: 94 MG/DL
HBA1C MFR BLD: 4.9 %
HBV SURFACE AG SERPL QL IA: NONREACTIVE
HCV AB SER QL: NONREACTIVE
HIV 1+2 AB+HIV1 P24 AG SERPL QL IA: NONREACTIVE
PROLACTIN SERPL-MCNC: 3.7 UG/L (ref 3–20)
RH FACTOR (ANTIGEN D): NORMAL
RUBV IGG SERPL IA-ACNC: 2 IA
RUBV IGG SERPL QL IA: POSITIVE
TREPONEMA PALLIDUM IGG+IGM AB [PRESENCE] IN SERUM OR PLASMA BY IMMUNOASSAY: NONREACTIVE
TSH SERPL-ACNC: 1.19 MIU/L (ref 0.44–3.98)
VARICELLA ZOSTER IGG INDEX: 3.8 IA
VZV IGG SER QL IA: POSITIVE

## 2024-10-07 PROCEDURE — 87340 HEPATITIS B SURFACE AG IA: CPT

## 2024-10-07 PROCEDURE — 83516 IMMUNOASSAY NONANTIBODY: CPT

## 2024-10-07 PROCEDURE — 86780 TREPONEMA PALLIDUM: CPT

## 2024-10-07 PROCEDURE — 99205 OFFICE O/P NEW HI 60 MIN: CPT | Performed by: OBSTETRICS & GYNECOLOGY

## 2024-10-07 PROCEDURE — 86900 BLOOD TYPING SEROLOGIC ABO: CPT

## 2024-10-07 PROCEDURE — 86901 BLOOD TYPING SEROLOGIC RH(D): CPT

## 2024-10-07 PROCEDURE — 84146 ASSAY OF PROLACTIN: CPT

## 2024-10-07 PROCEDURE — 84443 ASSAY THYROID STIM HORMONE: CPT

## 2024-10-07 PROCEDURE — 86317 IMMUNOASSAY INFECTIOUS AGENT: CPT

## 2024-10-07 PROCEDURE — 86787 VARICELLA-ZOSTER ANTIBODY: CPT

## 2024-10-07 PROCEDURE — 86850 RBC ANTIBODY SCREEN: CPT

## 2024-10-07 PROCEDURE — 87591 N.GONORRHOEAE DNA AMP PROB: CPT

## 2024-10-07 PROCEDURE — 86803 HEPATITIS C AB TEST: CPT

## 2024-10-07 PROCEDURE — 99215 OFFICE O/P EST HI 40 MIN: CPT | Mod: GC | Performed by: OBSTETRICS & GYNECOLOGY

## 2024-10-07 PROCEDURE — 87389 HIV-1 AG W/HIV-1&-2 AB AG IA: CPT

## 2024-10-07 PROCEDURE — 83036 HEMOGLOBIN GLYCOSYLATED A1C: CPT

## 2024-10-07 PROCEDURE — 87491 CHLMYD TRACH DNA AMP PROBE: CPT

## 2024-10-07 ASSESSMENT — PAIN SCALES - GENERAL: PAINLEVEL: 0-NO PAIN

## 2024-10-07 NOTE — PROGRESS NOTES
Visit Type: In Person    NEW FERTILITY PATIENT VISIT    Referred by: Mary Aiken  Accompanied today by: spouse      Morena Franco is a 29 y.o.  female who presents with Infertility     PRIOR EVALUATION / TREATMENT  Hysterosalpingogram: never done  Saline Infused Sonography: never done  GYN Pelvic Ultrasound: never done  Other:  TSH, FSH/LH, E2 wnl    Prior Labs   Latest Reference Range & Units Most Recent   FOLLICLE STIMULATING HORMONE IU/L 8.9  23 15:25   HCG, Beta-Quantitative mIU/mL <2  23 15:25   LH IU/L 9.4  23 15:25   Thyroid Stimulating Hormone 0.44 - 3.98 mIU/L 1.54  23 15:25   Estradiol pg/mL 88  23 15:25        Relationship Status:     Have you ever been pregnant? No  How many times have you been pregnant? 0  Have you ever had a miscarriage? No  How many times have you had a miscarriage? 0     OB Hx     OB History          0    Para   0    Term   0       0    AB   0    Living   0         SAB   0    IAB   0    Ectopic   0    Multiple   0    Live Births   0                 GYN HISTORY    History of STD or PID: No  LMP:   Last pap smear:  NILM  History of abnormal paps: No  History of abnormal mammogram: No  Date of last Mammogram: never done  Coitus:  3-4x/fertile week - were using ovulation predictor kits  Pain with intercourse, bowel movements, or full bladder: No     Pelvic pain: No    MENSTRUAL HISTORY:   Menarche: 12  Contraception:  none  Cycle length:  Q 28-30 days  Bleeding length: 3 days - 2 days off - 1 more day  Flow:  Light   Dysmenorrhea: No     ENDOCRINE HISTORY  Nipple Discharge: No  Vision changes: No  Headaches: No  Excess hair growth: No  Acne: No  Oily skin:No  Recent weight change: No  Significant exercise history: No  History of eating disorder: Yes - binge eating in college. Has been on Vyvanse since    PMH  Scoliosis  Anxiety - controlled on duloxetine  Binge eating disorder in college - controlled on  Vyvanse    MEDICATIONS  Current Outpatient Medications on File Prior to Visit   Medication Sig Dispense Refill    DULoxetine (Cymbalta) 60 mg DR capsule Take 1 capsule (60 mg) by mouth once daily.      LORazepam (Ativan) 2 mg tablet Take 1 p.o. 1 hour before procedure 1 tablet 0    LORazepam (Ativan) 2 mg tablet Take 1 p.o. 1 hour before the procedure 1 tablet 0    Vyvanse 50 mg capsule Take 1 capsule (50 mg) by mouth once daily.       No current facility-administered medications on file prior to visit.     PSH  Gum surgery  Epidural injections for scoliosis    PSYCH HISTORY  Past psych history: Yes anxiety on duloxetine  Prior hospitalization for mental health disorder: No     SOCIAL HISTORY  Occupation: works at a camp  Social History     Tobacco Use    Smoking status: Never    Smokeless tobacco: Never   Substance Use Topics    Alcohol use: Never    Drug use: Never   Smoking: No  Alcohol Use: Yes occasional  Drug Use: Yes marijuana    History of incarceration: No  History of domestic violence: No  History of  incest or rape: No     PARTNER HISTORY    Partner: Name: Douglas Franco MRN [id number]   : 1995  Occupation: sales  Prior fertility history: none prior  PMH: single functioning kidney, recurrent UTIs requiring bladder resection, PUD, ADHD, eye trauma, hydrocele  PSH: bladder resection surgery, eye surgery  Past psych history: Yes anxiety and depression  Prior hospitalization for mental health disorder: Yes   History of reproductive injuries or surgeries: no  Smoking: No  Alcohol Use: Yes occasional  Drug Use: Yes marijuana  History of incarceration: No  Medications: prilosec, latanoprost, adderall, eye drops  History of STD: No  History of testosterone use: No  History of reproductive anomalies: Yes hydrocele  Prior Semen Analysis completed? No     FAMILY HISTORY   No family history on file.  Family History of Blood Clots: No  Family history of breast, ovary, colon, endometrial cancer:  "No    GENETIC HISTORY  Ethnic background patient:   Ethnic background partner:   Genetic Disease in Family: No  Birth Defects in Family: No  Genetic screening performed previously: No     BMI:   BMI Readings from Last 1 Encounters:   10/07/24 29.52 kg/m²     VITALS:  /77   Pulse (!) 114   Ht 1.626 m (5' 4\")   Wt 78 kg (172 lb)   BMI 29.52 kg/m²   LMP: No LMP recorded.    ASSESSMENT   29 y.o.  female with  primary infertility x 2 year, suspected ovulation and the following pertinent medical issues: None .  Partner SA: No Assessment    COUNSELING  We discussed causes of infertility including hormonal, egg quality issues, structural problems such as endometriosis, adhesions, or tubal problems, uterine factors such as polyps or fibroids, and sperm issues. Reviewed evaluation of such as well. We discussed various methods for achieving pregnancy in some detail including, ovulation induction, insemination, superovulation and IVF.      Routine Testing  Fertility Center  STDs Within 1 year   Genetic carrier Waiver/Completed   T&S Within 1 year   AMH Within 1 year   TSH Within 1 year   Rubella/Varicella Within 5 years     BMI Testing  Fertility Center  CBC Within 1 year   CMP Within 1 year   HgbA1c Within 1 year   Mag, Phos, Vit D <18 Within 1 year   MFM > 40  REQ   Wt loss consult > 40 OPT     PLAN  Orders Placed This Encounter   Procedures    Hysterosalpingogram (HSG)    US pelvis transvaginal    FL hysterosalpingogram    Antimullerian Hormone (Amh)    TSH with reflex to Free T4 if abnormal    Type And Screen    Rubella Antibody, Igg    Varicella Zoster Antibody, Igg    Hepatitis B surface antigen    Hepatitis C Antibody    HIV-1 and HIV-2 antibodies    Syphilis Screen with Reflex    C. Trachomatis / N. Gonorrhoeae, Amplified Detection    Myriad Foresight Carrier Screen    Prolactin    Hemoglobin A1C    POCT pregnancy, urine manually resulted     GENETIC SCREENING " PATIENT  Ordered    PARTNER  Yes Semen Analysis: Ordered  Yes Genetic screening: Ordered    FOLLOW UP   Consults:  Nne  Chart to primary nurse for care coordination and patient check list/education  Enroll in Engaged MD  Take prenatal vitamins, vitamin D 2000 IUs daily  Discussed that pap and mammogram must be updated per ACOG guidelines before treatment can begin  Discussed that treatment cannot proceed until checklist items are complete   6 week follow up with Any provider  Additional testing for BMI < 18 or > 40: NA  Sperm Donor:      MD Completion:  Ectopic Risk: No  Medically Complex: No    Fertility Plan Update:  Complete testing as above and address any issues.  We discussed Clomid 100mg/IUI as initial treatment if all testing is WNL    Chen Nino  10/07/2024  12:40 PM    I reviewed the key and critical portions of the history and physical exam and/or was physically present for the key and critical portions performed by the resident.  I reviewed the resident's documentation and discussed the patient with the resident.  I agree with the resident's medical decision making as documented on the resident's note.  Renay Montano 10/07/24 1:41 PM

## 2024-10-08 LAB
C TRACH RRNA SPEC QL NAA+PROBE: NEGATIVE
N GONORRHOEA DNA SPEC QL PROBE+SIG AMP: NEGATIVE

## 2024-10-11 LAB — MIS SERPL-MCNC: 5.79 NG/ML (ref 0.18–11.71)

## 2024-11-01 ENCOUNTER — ANCILLARY PROCEDURE (OUTPATIENT)
Dept: ENDOCRINOLOGY | Facility: CLINIC | Age: 29
End: 2024-11-01
Payer: COMMERCIAL

## 2024-11-01 DIAGNOSIS — N91.5 OLIGOMENORRHEA, UNSPECIFIED TYPE: ICD-10-CM

## 2024-11-01 PROCEDURE — 76830 TRANSVAGINAL US NON-OB: CPT

## 2024-11-01 PROCEDURE — 76830 TRANSVAGINAL US NON-OB: CPT | Performed by: OBSTETRICS & GYNECOLOGY

## 2024-11-25 ENCOUNTER — TELEMEDICINE CLINICAL SUPPORT (OUTPATIENT)
Dept: ENDOCRINOLOGY | Facility: CLINIC | Age: 29
End: 2024-11-25
Payer: COMMERCIAL

## 2024-11-25 DIAGNOSIS — N97.9 FEMALE INFERTILITY: Primary | ICD-10-CM

## 2024-11-25 PROCEDURE — 99215 OFFICE O/P EST HI 40 MIN: CPT | Performed by: OBSTETRICS & GYNECOLOGY

## 2024-11-25 PROCEDURE — 99215 OFFICE O/P EST HI 40 MIN: CPT | Mod: GC,95 | Performed by: OBSTETRICS & GYNECOLOGY

## 2024-11-25 NOTE — PROGRESS NOTES
Virtual or Telephone Consent: An interactive audio and video telecommunication system which permits real time communications between the patient (at the originating site) and provider (at the distant site) was utilized to provide this telehealth service and Verbal consent was requested and obtained from Morena Franco on this date, 24 for a telehealth visit.    Follow Up Visit HPI    Patient is a 29 y.o.  female with Unexplained Infertility presenting today for follow up visit.   -Unexplained to date, still awaiting HSG    Testing to date:    Latest Reference Range & Units Most Recent   FOLLICLE STIMULATING HORMONE IU/L 8.9  23 15:25   Hemoglobin A1C See comment % 4.9  10/7/24 14:02   HCG, Beta-Quantitative mIU/mL <2  23 15:25   LH IU/L 9.4  23 15:25   PROLACTIN 3.0 - 20.0 ug/L 3.7  10/7/24 14:02   Thyroid Stimulating Hormone 0.44 - 3.98 mIU/L 1.19  10/7/24 14:02   Estradiol pg/mL 88  23 15:25   Estimated Average Glucose Not Established mg/dL 94  10/7/24 14:02   Anti-Mullerian Hormone 0.176 - 11.705 ng/mL 5.786  10/7/24 14:02       Hysterosalpingogram: ordered, not yet done  -Was not able to get scheduled  Saline Infused Sonography: None  GYN Pelvic Ultrasound: US PELVIS TRANSVAGINAL (2024):   Normal appearing uterus with thin endometrium noted, ovaries within normal limits bilaterally.     Partner SA: Normal  Douglas Franco MRN [id number]   : 1996    Volume (Semen)  >=1.5 mL 1.8   Concentration(Semen)  >=15 mill/mL 66.67   Total Motility (Semen)  >=40 % 66   Prog. Motility (Semen)  >=32 % 58   Non Prog. Motility (Semen)  % 8   Total No of Sperm (Semen)  >=39 mill 120.00   Total No of Motile (Semen)  mill 78.60   % Normal (Semen)  >=4 % 2.5 Abnormal    % Head defects (Semen)  % 97.3   % Neck Midpiece (Semen)  % 25.3   % Tail defects (Semen)  % 6.5   % Ex Residual Cytoplasm (Semen)  % 0   Tot. No of Norm. Sperm (Semen)  mill 3.000   Tot. No of Norm. Motile Sperm  (Semen)  marilyn 1.965       Treatment to date:   None      Past Medical History:   Diagnosis Date    Herpesviral infection of urogenital system, unspecified 2017    Recurrent genital herpes simplex type 2 infection     No past surgical history on file.  Current Outpatient Medications on File Prior to Visit   Medication Sig Dispense Refill    DULoxetine (Cymbalta) 60 mg DR capsule Take 1 capsule (60 mg) by mouth once daily.      LORazepam (Ativan) 2 mg tablet Take 1 p.o. 1 hour before procedure 1 tablet 0    LORazepam (Ativan) 2 mg tablet Take 1 p.o. 1 hour before the procedure 1 tablet 0    Vyvanse 50 mg capsule Take 1 capsule (50 mg) by mouth once daily.       No current facility-administered medications on file prior to visit.       BMI:   BMI Readings from Last 1 Encounters:   10/07/24 29.52 kg/m²     VITALS:  There were no vitals taken for this visit.  LMP: No LMP recorded.    ASSESSMENT   29 y.o.  female with primary infertility, suspected ovulation x 2 years, Unexplained Infertility and the following pertinent medical issues: none.  -Unexplained to date- was not able to get in for HSG last month    -Has decided not to proceed with Myriad screening    COUNSELING  We discussed that unexplained infertility likely represents a subclinical form of infertility that we cannot diagnosis using our conventional testing techniques. This does not mean that there is no reason for the infertility, just that we cannot with our current technology discover the reason for the infertility. Therefore, our treatments are empiric and our goal is to improve both the quality and quantity of follicular development and also the quality and quantity of sperm that is present in the uterus. We therefore recommend Clomid and intrauterine insemination as the first line treatment in an attempt to improve both sperm and egg quality. It is possible that by slightly improving the sperm and slightly improving the egg that we can  improve the chances of conception.    We discussed that Clomid is used for ovulation induction. We discussed common side effects of Clomid including headaches, vision changes, hot flashes, mood changes, and breast tenderness.  We discussed that there is an increased risk of multiple gestation with Clomid approximately 10% for twins and less than 1% for triplets.  Counseled regarding option of selective reduction for higher order multiples.    Routine Testing  Fertility Center  STDs Within 1 year   Genetic carrier Waiver/Completed   T&S Within 1 year   AMH Within 1 year   TSH Within 1 year   Rubella/Varicella Within 5 years     BMI Testing  Fertility Center  CBC Within 1 year   CMP Within 1 year   HgbA1c Within 1 year   Mag, Phos, Vit D <18 Within 1 year   MFM > 40  REQ   Wt loss consult > 40 OPT     PLAN  No orders of the defined types were placed in this encounter.      FOLLOW UP   Consults:  none  Engaged MD  Take prenatal vitamins, vitamin D 2000 IUs daily  Discussed that treatment cannot proceed until checklist items are complete.   Chart to primary nurse for care coordination and patient check list/education.      MD Completion:  Ectopic Risk: No  Medically Complex: No  Outstanding boarding pass items: HSG, needs Myriad waiver    Fertility Plan Update:  Patient will start Clomid 100 mg CD3-7 with TIC this coming cycle while she tries to schedule her HSG  If tubes patent on  HSG can do up to 3 cycles Clomid TIC  Recommend adding IUI--patient prefers 3 cycles TIC first.  When ready to do IUI can use LH kits to time IUI, prometrium for luteal support.    Send mychart message when HSG done to confirm tubal patency--if tubes are blocked we will have to schedule follow up visit to further discuss options.    Elvira Moeller  11/25/2024  8:35 AM    I reviewed the key and critical portions of the history and physical exam and/or was physically present for the key and critical portions performed by the resident.  I  reviewed the resident's documentation and discussed the patient with the resident.  I agree with the resident's medical decision making as documented on the resident's note.    I counseled the patient and edited the above note to reflect the counseling and plan.   Renay Montano 11/25/24 3:30 PM

## 2024-11-26 ENCOUNTER — TELEPHONE (OUTPATIENT)
Dept: ENDOCRINOLOGY | Facility: CLINIC | Age: 29
End: 2024-11-26

## 2024-11-26 ENCOUNTER — TELEMEDICINE (OUTPATIENT)
Dept: ENDOCRINOLOGY | Facility: CLINIC | Age: 29
End: 2024-11-26
Payer: COMMERCIAL

## 2024-11-26 DIAGNOSIS — N97.9 FEMALE INFERTILITY: Primary | ICD-10-CM

## 2024-11-26 PROCEDURE — 99212 OFFICE O/P EST SF 10 MIN: CPT

## 2024-11-26 PROCEDURE — 1036F TOBACCO NON-USER: CPT

## 2024-11-26 RX ORDER — CLOMIPHENE CITRATE 50 MG/1
100 TABLET ORAL DAILY
Qty: 10 TABLET | Refills: 0 | Status: SHIPPED | OUTPATIENT
Start: 2024-11-26 | End: 2024-12-01

## 2024-11-26 NOTE — PROGRESS NOTES
Virtual or Telephone Consent: An interactive audio and video telecommunication system which permits real time communications between the patient (at the originating site) and provider (at the distant site) was utilized to provide this telehealth service    Patient is a 29 yr old female  who presents for a Boarding Pass visit to start fertility treatment       Boarding Pass Oral TIC/IUI    Age: 29 y.o.    Provider: Renay Montano MD  Primary RN: Joe  Reasons for Treatment: Unexplained Infertility  Last BMI  10/07/24 : 29.52 kg/m²       Past Medical History:   Diagnosis Date    Herpesviral infection of urogenital system, unspecified 2017    Recurrent genital herpes simplex type 2 infection       Date Done Consultation Results/Comments   24 Medication Protocol   Fertility Plan Update:  Patient will start Clomid 100 mg CD3-7 with TIC this coming cycle while she tries to schedule her HSG  If tubes patent on  HSG can do up to 3 cycles Clomid TIC  Recommend adding IUI--patient prefers 3 cycles TIC first.  When ready to do IUI can use LH kits to time IUI, prometrium for luteal support.   N/A Procedure Order Placed []  N/A TIC   Date Done Female Labs Results/Comments   10/7/2024 T&S (Q 1 Year) ABO: O  Rh: POS  Antibody: NEG     10-7-2024 TSH 1.19   10-7-2024 AMH 5.786   10-7-2024 HgbA1C 4.9%   10/7/2024 Hep B sAg Nonreactive   10/7/2024 Hep C AB Nonreactive   10/7/2024 HIV Nonreactive   10/7/2024 Syphilis Nonreactive   10/7/2024 GC/CT GC: Negative  CT: Negative   10/7/2024 Rubella (Q 5 Years) Positive   10/7/2024 Varicella (Q 5 Years) Positive (A)    HSG- scheduled for 2024 Pelvic ultrasound Normal    24 Carrier Screening Myriad 2bP:   Declined  N/A     GYN Waiver []      Date Done Male Labs (required if IUI)   Results/Comments  AARON TROTTER (MRN: [id number])   10/7/2024 Hep B sAg Nonreactive   10/7/2024 Hep C AB  Nonreactive   10/7/2024 HIV Nonreactive   10/7/2024  Syphilis Nonreactive   10/7/2024 GC/CT GC: Negative  CT: Negative   11/26/24 Carrier Screening   Declined  N/A   10/15/2024 Semen Analysis  Volume(mL): 1.8  Count(million): 66.67  Motility(%): 66  Motile Count(million): 78.60 million     MD Completion:  Ectopic Risk: No  Medically Complex: No    Boarding Pass reviewed with CJ Guerra LPN and is complete. patient & partner are cleared for fertility treatment:  Clomid 100 mg days 3-7/TIC x 3 cycles if tubes patent on HSG. HSG scheduled for 12-2-2024. LMP 11-. Vianey Horner CNP 11/26/24 4:07 PM

## 2024-11-26 NOTE — TELEPHONE ENCOUNTER
Reason for call: reporting start of cycle  LMP: 11/26  Treatment type: timed intercourse  Note: wants a call back for next steps

## 2024-11-26 NOTE — PROGRESS NOTES
Telephone call to patient. LMP 11/26/24. She is scheduling HSG with this cycle and will also start clomid 100mg for TIC. Scheduled patient for virtual BP visit today with Sukh Gupta 11/26/24 10:39 AM

## 2024-12-02 ENCOUNTER — ANCILLARY PROCEDURE (OUTPATIENT)
Dept: ENDOCRINOLOGY | Facility: CLINIC | Age: 29
End: 2024-12-02
Payer: COMMERCIAL

## 2024-12-02 ENCOUNTER — HOSPITAL ENCOUNTER (OUTPATIENT)
Dept: RADIOLOGY | Facility: HOSPITAL | Age: 29
Discharge: HOME | End: 2024-12-02
Payer: COMMERCIAL

## 2024-12-02 DIAGNOSIS — Z01.812 ENCOUNTER FOR PREPROCEDURAL LABORATORY EXAMINATION: ICD-10-CM

## 2024-12-02 DIAGNOSIS — Z31.41 FERTILITY TESTING: ICD-10-CM

## 2024-12-02 LAB — PREGNANCY TEST URINE, POC: NEGATIVE

## 2024-12-02 PROCEDURE — 58340 CATHETER FOR HYSTEROGRAPHY: CPT | Performed by: NURSE PRACTITIONER

## 2024-12-02 PROCEDURE — 74740 X-RAY FEMALE GENITAL TRACT: CPT | Performed by: OBSTETRICS & GYNECOLOGY

## 2024-12-02 PROCEDURE — 2550000001 HC RX 255 CONTRASTS: Performed by: OBSTETRICS & GYNECOLOGY

## 2024-12-02 PROCEDURE — 74740 X-RAY FEMALE GENITAL TRACT: CPT

## 2024-12-02 NOTE — PROCEDURES
Hysterosalpingogram (HSG)    Date/Time: 12/2/2024 8:51 AM    Performed by: SEPIDEH Irvin  Authorized by: SEPIDEH Irvin    Consent:     Consent obtained:  Verbal and written    Consent given by:  Patient    Risks, benefits, and alternatives were discussed: yes      Risks discussed:  Bleeding, infection and pain    Alternatives discussed:  No treatment  Universal protocol:     Procedure explained and questions answered to patient or proxy's satisfaction: yes      Relevant documents present and verified: yes      Test results available: yes      Imaging studies available: yes      Required blood products, implants, devices, and special equipment available: yes      Immediately prior to procedure, a time out was called: yes      Patient identity confirmed:  Verbally with patient, hospital-assigned identification number and arm band  Indications:     Indications:  Fertility testing  Pre-procedure details:     Skin preparation:  Povidone-iodine  Sedation:     Sedation type:  None  Anesthesia:     Anesthesia method:  None  Procedure specific details:      Done by this LARRY      Hysterosalpingogram (HSG) risks, benefits, alternatives, and personnel discussed with patient who agreed to proceed.    Procedural time out        Done in room where procedure done: Yes        Done just before starting procedure: Yes                                                 All members of procedural team involved in time-out: Yes                  Active communication used: Yes                                                         All team members agreed on procedure: Yes                                        Patient correctly identified by two identifiers: Yes                                  Correct side and site identified: Yes                                                     All needed special equipment/instruments available: Yes               Prior to the start of the procedure a time out was taken and the  following were verified: The identity of the patient using two patient identifiers.   Urine pregnancy test was performed and was negative.   Risks, benefits, and alternatives of the procedure were explained to the patient.  Informed consent was obtained.     The patient was placed in the dorsal lithotomy position and a sterile speculum was placed in the vagina. The cervix was sterilized with Betadine x 3. The anterior lip of the cervix was grasped with a single-tooth tenaculum. The (balloon/acorn) cannula was then placed in the cervix and secured to the tenaculum. The patient was positioned and images were taken with fluoroscopy as dye was inserted through the cannula. All instruments were then removed. The patient tolerated the procedure well and was discharged home the same day without complications.    Uterus:  normal contour without filling defects  Tubes:  bilateral patency with free spill of dye, normal tubal architecture noted, and no loculations present.  Based on these findings, my recommendation is: No further follow up required.    The patient was counseled regarding the above findings and images were reviewed with patient at the time of the exam.       Radha Armstrong 12/02/24 8:52 AM      Post-procedure details:     Procedure completion:  Tolerated well, no immediate complications

## 2024-12-24 ENCOUNTER — APPOINTMENT (OUTPATIENT)
Dept: ENDOCRINOLOGY | Facility: CLINIC | Age: 29
End: 2024-12-24
Payer: COMMERCIAL

## 2025-01-02 ENCOUNTER — TELEPHONE (OUTPATIENT)
Dept: ENDOCRINOLOGY | Facility: CLINIC | Age: 30
End: 2025-01-02
Payer: COMMERCIAL

## 2025-01-02 DIAGNOSIS — Z32.01 POSITIVE URINE PREGNANCY TEST (HHS-HCC): ICD-10-CM

## 2025-01-02 NOTE — PROGRESS NOTES
Initial HCG: Patient called with + HPT  Patient's JUSTICE Provider: Renay Montano MD  Infertility dx: Unexplained Infertility  Achieved pregnancy by: Timed intercourse  Fertility medications used this cycle: Clomid  LMP: Patient's last menstrual period was 2024  EGA based on (IUI date, ET ): LMP    Updated G/P with this pregnancy: G1  OB HX:  OB History    Para Term  AB Living   0 0 0 0 0 0   SAB IAB Ectopic Multiple Live Births   0 0 0 0 0       Type & Screen: 10/7/2024  ABO: O  Rh: POS  Antibody: NEG  History of miscarriage: No  History of pelvic/abdominal surgeries: No  History of STDs/PID: No  Hx of ectopic: No  Hx of tubal disease/ blockage: No    Risk for ectopic: No      Current medications:     Current Outpatient Medications:     DULoxetine (Cymbalta) 60 mg DR capsule, Take 1 capsule (60 mg) by mouth once daily., Disp: , Rfl:     Vyvanse 50 mg capsule, Take 1 capsule (50 mg) by mouth once daily., Disp: , Rfl:  stopped taking today 2025  Spirulina - 1 1/2 grams once a day  Bio-amunozine forta - once a day  Fish oil 1000 mg daily        PNV: Yes with folic acid  Vitamin D 2000 IUs: Yes 5 ggts a day  Luteal support: None  Additional medications: n/a    Labs ordered/Plan:   [facility] ordered. Team will reach out to patient with results and plan.   Discussed early pregnancy versus miscarriage versus ectopic. Precautions given.   Patient expresses understanding of plan and is agreeable.    patria alvarez  2025  4:02 PM

## 2025-01-02 NOTE — TELEPHONE ENCOUNTER
Returned patient call to confirm some information related to positive pregnancy test, left voicemail and told pt to call back to confirm if she was a natural cycle or was on clomid and if she knows when she ovulated and meds she is on.  Will pend order to NP to sign [place] [facility]  Sylvia Riley 01/02/25 11:39 AM

## 2025-01-02 NOTE — TELEPHONE ENCOUNTER
Returned call to patient, patient will go to  lab tomorrow 01/03/2025 for BHCG. Patient pregnancy achieved by Clomid. LMP = 11/26/2024. Patient believes she ovulated on 12/12/2024. Confirmed current medications.  patria alvarez 01/02/25 4:01 PM

## 2025-01-03 ENCOUNTER — LAB (OUTPATIENT)
Dept: LAB | Facility: LAB | Age: 30
End: 2025-01-03
Payer: COMMERCIAL

## 2025-01-03 DIAGNOSIS — Z32.01 POSITIVE URINE PREGNANCY TEST (HHS-HCC): ICD-10-CM

## 2025-01-03 DIAGNOSIS — O36.80X0 ENCOUNTER TO DETERMINE FETAL VIABILITY OF PREGNANCY, NOT APPLICABLE OR UNSPECIFIED FETUS: Primary | ICD-10-CM

## 2025-01-03 LAB — B-HCG SERPL-ACNC: 8682 MIU/ML

## 2025-01-03 PROCEDURE — 84702 CHORIONIC GONADOTROPIN TEST: CPT

## 2025-01-03 NOTE — PROGRESS NOTES
Initial HCG: Patient called with + HPT on .   Patient's JUSTICE Provider: Renay Montano MD  Infertility dx: Unexplained Infertility  Achieved pregnancy by: TIC  Fertility medications used this cycle: Clomid 100mg   LMP: Patient's last menstrual period was 2024  EGA based on (IUI date, ET ): LMP- 5w3d today     Updated G/P with this pregnancy: G1  OB HX:          OB History    Para Term  AB Living   0 0 0 0 0 0   SAB IAB Ectopic Multiple Live Births    0 0 0 0 0          Type & Screen: 10/7/2024  ABO: O  Rh: POS  Antibody: NEG  History of miscarriage: No  History of pelvic/abdominal surgeries: No  History of STDs/PID: No  Hx of ectopic: No  Hx of tubal disease/ blockage: No     Risk for ectopic: No        Current Medications  Vyvanse- stopped taking on 25  Duloxetine 60mg daily  Bio-Immunizine Forte supplement daily  Fish Oil 1,000mg daily  Spirulina 3 tablets daily  Bio-D-Mulsion Forte (vitamin D) 5 drops daily  Dejuan Prenatal gummies 2 per day       PNV: Yes with folic acid  Vitamin D 2000 IUs:   Luteal support: None  Additional medications: N/A     [facility]   [facility] Team will reach out to patient with results and plan.   Discussed early pregnancy versus miscarriage versus ectopic. Precautions given.   Patient expresses understanding of plan and is agreeable.    Josseline LEWIS Abel 25 7:54 AM    Monitoring patient: Clomid 100 mg/TIC cycle, LMP on 2024, 5.3 wks, initial HCG Level today elevated at 8,682. Patient to do an OB Scan @ 6.5-7 wks and continue current medication protocol. Plan to be communicated by RN. Plan agreed upon by Dr. Patel. Vianey Horner, CNP 25 2:53 PM     Called patient with results and plan.   Component      Latest Ref Rng 1/3/2025   HCG, Beta-Quantitative      <5 mIU/mL 8,682 (H)       Phone call to patient to discuss results of blood pregnancy test. Shared with patient that HCG level is 8,682 and they are 5 weeks and 3 days pregnant.  Reviewed with patient that we are cautiously optimistic that this is a good level. Reviewed with patient to continue all current medications and that we will plan OB scan for Thursday 1/9. Reviewed with patient to reach out to their primary OBGYN to schedule new OB visit at this time as patient needs to be seen by 9 weeks gestation. She verbalizes understanding and is agreeable.    Josseline Roman 01/03/25 3:25 PM

## 2025-01-07 NOTE — PROGRESS NOTES
Visit Type: In Person    OB Scan    Ectopic risk factor: no  PGTA/PGTM: no  Blood type: O+  Method of Conception: Clomid/TIC, LMP 2024, 6.3 wks  Meds: PNV daily    Reviewed results from OB ultrasound today and results reviewed with pt as follows:     OB Scan results:   Assessment LMP on 2024.  present. OMAYRA: 2025. Gest. age 6 w + 2 d  Dating Date Details Gest. age OMAYRA  LMP 2024: 6 w + 2 d, OMAYRA 2025  Stated OMAYRA 6 w + 2 d, OMAYRA 2025  U/S 2025 based upon CRL 6 w + 1 d, OMAYRA 9/3/2025  Impression Early single intrauterine pregnancy with yolk sac and fetal pole identified. There is no  definitive cardiac activity present at this early gestational age. Size is consistent with dating  provided.  Follow-up Follow up for repeat assessment per clinical team  Assessment Gestational sac: visualized. Location: intrauterine  Yolk sac: visualized  Embryo: visualized  Cardiac activity: uncertain  Early placenta: circumferential  YS 2.7 mm   CRL 3.0 mm 6w 1d    Detailed discussion with patient about her scan results, pregnancy, and next steps:    Plan:   Reviewed medications in detail. She will continue PNV.   Discussed with patient any pregnancy concerns and discussed establishing care with an OB. Will schedule a new OB visit with Dr. Henao.  Will provide recommendations if she desires.   Advised to call with bleeding, pain or concerns. Denies pain, bleeding, or cramping  C/O nausea, able to keep food/fluids down. Given N/V in pregnancy handout: encouraged to try Vitamin B 6 25 mg p.o. TID and if worsens can add in 1/2 Unisom tablet (12.5 mg). Patient to call back if without relief.  Repeat OB Scan 1 week    Ultrasound results and Plan of care reviewed with Dr. Vero Philip MD    Intimate Exam Performed: No, an intimate exam was not performed at this encounter.     Vianey Horner CNP 25 2:00 PM

## 2025-01-09 ENCOUNTER — OFFICE VISIT (OUTPATIENT)
Dept: ENDOCRINOLOGY | Facility: CLINIC | Age: 30
End: 2025-01-09
Payer: COMMERCIAL

## 2025-01-09 ENCOUNTER — ANCILLARY PROCEDURE (OUTPATIENT)
Dept: ENDOCRINOLOGY | Facility: CLINIC | Age: 30
End: 2025-01-09
Payer: COMMERCIAL

## 2025-01-09 DIAGNOSIS — O36.80X0 ENCOUNTER TO DETERMINE FETAL VIABILITY OF PREGNANCY, NOT APPLICABLE OR UNSPECIFIED FETUS: ICD-10-CM

## 2025-01-09 DIAGNOSIS — O36.80X0 ENCOUNTER TO DETERMINE FETAL VIABILITY OF PREGNANCY, NOT APPLICABLE OR UNSPECIFIED FETUS: Primary | ICD-10-CM

## 2025-01-09 PROCEDURE — 76817 TRANSVAGINAL US OBSTETRIC: CPT

## 2025-01-09 PROCEDURE — 99212 OFFICE O/P EST SF 10 MIN: CPT

## 2025-01-09 PROCEDURE — 99212 OFFICE O/P EST SF 10 MIN: CPT | Mod: 25

## 2025-01-09 PROCEDURE — 76817 TRANSVAGINAL US OBSTETRIC: CPT | Performed by: STUDENT IN AN ORGANIZED HEALTH CARE EDUCATION/TRAINING PROGRAM

## 2025-01-14 NOTE — PROGRESS NOTES
Visit Type: In Person    Repeat OB Scan    Ectopic risk factor: no  PGTA/PGTM: no  Blood type: O+  Method of Conception: Clomid/TIC, LMP 2024, 6.3 wks  Meds: PNV daily    Reviewed results from OB ultrasound today and results reviewed with pt as follows:     OB Scan results:   Assessment LMP on 2024.  present. OMAYRA: 2025. Gest. age 7 w + 2 d  Dating Date Details Gest. age OMAYRA  LMP 2024: 7 w + 2 d, OMAYRA 2025  Stated OMAYRA 7 w + 2 d, OMAYRA 2025  U/S 2025 based upon CRL 7 w + 0 d, OMAYRA 2025  Impression Early single intrauterine pregnancy with cardiac activity present. Size is consistent with dating  provided. Corpus luteum cyst is seen on right ovary.  Follow-up Plan: Release to OB Provider  Assessment Gestational sac: visualized. Location: intrauterine  Yolk sac: visualized  Embryo: visualized  Cardiac activity: present  Early placenta: low lying  YS 3.1 mm   CRL 8.3 mm 7w 0d    bpm  Size = dates    Detailed discussion with patient about her scan results, pregnancy, and next steps:    Plan:   Reviewed medications in detail. She will continue PNV.   Discussed with patient any pregnancy concerns and discussed establishing care with an OB. Will schedule a new OB visit with ULISES Garrett on 2025.  Will provide recommendations if she desires.   Advised to call with bleeding, pain or concerns. Denies any pain, bleeding, or cramping.  C/O nausea, relieved by eating small frequent meals and Vitamin B6 25 mg TID- to call back if worsens  C/O constipation not relieved by Miralax- encouraged to start Colace 100 mg BID and start Prune juice daily    Ultrasound results and Plan of care reviewed with Dr. Vero Philip MD    Intimate Exam Performed: No, an intimate exam was not performed at this encounter.     Vianey Horner CNP 25 1:37 PM

## 2025-01-15 ENCOUNTER — APPOINTMENT (OUTPATIENT)
Dept: OBSTETRICS AND GYNECOLOGY | Facility: CLINIC | Age: 30
End: 2025-01-15
Payer: COMMERCIAL

## 2025-01-16 ENCOUNTER — OFFICE VISIT (OUTPATIENT)
Dept: ENDOCRINOLOGY | Facility: CLINIC | Age: 30
End: 2025-01-16
Payer: COMMERCIAL

## 2025-01-16 ENCOUNTER — ANCILLARY PROCEDURE (OUTPATIENT)
Dept: ENDOCRINOLOGY | Facility: CLINIC | Age: 30
End: 2025-01-16
Payer: COMMERCIAL

## 2025-01-16 DIAGNOSIS — O36.80X0 ENCOUNTER TO DETERMINE FETAL VIABILITY OF PREGNANCY, SINGLE OR UNSPECIFIED FETUS: Primary | ICD-10-CM

## 2025-01-16 DIAGNOSIS — O36.80X0 ENCOUNTER TO DETERMINE FETAL VIABILITY OF PREGNANCY, NOT APPLICABLE OR UNSPECIFIED FETUS: ICD-10-CM

## 2025-01-16 PROCEDURE — 99212 OFFICE O/P EST SF 10 MIN: CPT | Mod: 25

## 2025-01-16 PROCEDURE — 76817 TRANSVAGINAL US OBSTETRIC: CPT

## 2025-01-16 PROCEDURE — 76817 TRANSVAGINAL US OBSTETRIC: CPT | Performed by: STUDENT IN AN ORGANIZED HEALTH CARE EDUCATION/TRAINING PROGRAM

## 2025-01-16 PROCEDURE — 99212 OFFICE O/P EST SF 10 MIN: CPT

## 2025-01-16 PROCEDURE — 76801 OB US < 14 WKS SINGLE FETUS: CPT | Performed by: STUDENT IN AN ORGANIZED HEALTH CARE EDUCATION/TRAINING PROGRAM

## 2025-01-29 ENCOUNTER — INITIAL PRENATAL (OUTPATIENT)
Dept: OBSTETRICS AND GYNECOLOGY | Facility: CLINIC | Age: 30
End: 2025-01-29
Payer: COMMERCIAL

## 2025-01-29 VITALS — WEIGHT: 190.2 LBS | SYSTOLIC BLOOD PRESSURE: 133 MMHG | DIASTOLIC BLOOD PRESSURE: 76 MMHG | BODY MASS INDEX: 32.65 KG/M2

## 2025-01-29 DIAGNOSIS — Z34.90 PRENATAL CARE, ANTEPARTUM (HHS-HCC): ICD-10-CM

## 2025-01-29 DIAGNOSIS — Z12.4 SCREENING FOR CERVICAL CANCER: ICD-10-CM

## 2025-01-29 DIAGNOSIS — Z36.82 ENCOUNTER FOR (NT) NUCHAL TRANSLUCENCY SCAN (HHS-HCC): Primary | ICD-10-CM

## 2025-01-29 PROBLEM — B00.9 HERPES SIMPLEX VIRUS TYPE 2 (HSV-2) INFECTION AFFECTING PREGNANCY IN FIRST TRIMESTER (HHS-HCC): Status: ACTIVE | Noted: 2025-01-29

## 2025-01-29 PROBLEM — F41.9: Status: ACTIVE | Noted: 2025-01-29

## 2025-01-29 PROBLEM — Z82.79 FAMILY HISTORY OF CONGENITAL HEART DEFECT: Status: ACTIVE | Noted: 2025-01-29

## 2025-01-29 PROBLEM — O98.511 HERPES SIMPLEX VIRUS TYPE 2 (HSV-2) INFECTION AFFECTING PREGNANCY IN FIRST TRIMESTER (HHS-HCC): Status: ACTIVE | Noted: 2025-01-29

## 2025-01-29 PROBLEM — Z3A.09 9 WEEKS GESTATION OF PREGNANCY (HHS-HCC): Status: ACTIVE | Noted: 2025-01-29

## 2025-01-29 PROBLEM — O09.00 PREGNANCY WITH HISTORY OF INFERTILITY, ANTEPARTUM (HHS-HCC): Status: ACTIVE | Noted: 2025-01-29

## 2025-01-29 LAB
POC BLOOD, URINE: NEGATIVE
POC GLUCOSE, URINE: NEGATIVE MG/DL
POC KETONES, URINE: NEGATIVE MG/DL
POC LEUKOCYTES, URINE: NEGATIVE
POC NITRITE,URINE: NEGATIVE
POC PROTEIN, URINE: NEGATIVE MG/DL

## 2025-01-29 PROCEDURE — 81003 URINALYSIS AUTO W/O SCOPE: CPT | Mod: QW | Performed by: ADVANCED PRACTICE MIDWIFE

## 2025-01-29 PROCEDURE — 0500F INITIAL PRENATAL CARE VISIT: CPT | Performed by: ADVANCED PRACTICE MIDWIFE

## 2025-01-29 RX ORDER — NAPROXEN SODIUM 220 MG/1
81 TABLET, FILM COATED ORAL DAILY
Qty: 90 TABLET | Refills: 3 | Status: SHIPPED | OUTPATIENT
Start: 2025-01-29 | End: 2026-01-29

## 2025-01-29 ASSESSMENT — ENCOUNTER SYMPTOMS
CARDIOVASCULAR NEGATIVE: 1
ALLERGIC/IMMUNOLOGIC NEGATIVE: 0
OCCASIONAL FEELINGS OF UNSTEADINESS: 0
LOSS OF SENSATION IN FEET: 0
DIFFICULTY URINATING: 0
PSYCHIATRIC NEGATIVE: 0
RESPIRATORY NEGATIVE: 1
HEMATOLOGIC/LYMPHATIC NEGATIVE: 0
FLANK PAIN: 0
CARDIOVASCULAR NEGATIVE: 0
MUSCULOSKELETAL NEGATIVE: 0
FREQUENCY: 0
NAUSEA: 1
VOMITING: 1
DEPRESSION: 0
GASTROINTESTINAL NEGATIVE: 0
CONSTITUTIONAL NEGATIVE: 0
NEUROLOGICAL NEGATIVE: 0
PSYCHIATRIC NEGATIVE: 1
EYES NEGATIVE: 0
DYSURIA: 0
ENDOCRINE NEGATIVE: 0
RESPIRATORY NEGATIVE: 0
HEMATURIA: 0

## 2025-01-29 ASSESSMENT — EDINBURGH POSTNATAL DEPRESSION SCALE (EPDS)
I HAVE BLAMED MYSELF UNNECESSARILY WHEN THINGS WENT WRONG: NO, NEVER
I HAVE FELT SAD OR MISERABLE: NO, NOT AT ALL
TOTAL SCORE: 0
I HAVE BEEN SO UNHAPPY THAT I HAVE BEEN CRYING: NO, NEVER
THE THOUGHT OF HARMING MYSELF HAS OCCURRED TO ME: NEVER
I HAVE LOOKED FORWARD WITH ENJOYMENT TO THINGS: AS MUCH AS I EVER DID
THINGS HAVE BEEN GETTING ON TOP OF ME: NO, I HAVE BEEN COPING AS WELL AS EVER
I HAVE BEEN ABLE TO LAUGH AND SEE THE FUNNY SIDE OF THINGS: AS MUCH AS I ALWAYS COULD
I HAVE BEEN SO UNHAPPY THAT I HAVE HAD DIFFICULTY SLEEPING: NOT AT ALL
I HAVE FELT SCARED OR PANICKY FOR NO GOOD REASON: NO, NOT AT ALL
I HAVE BEEN ANXIOUS OR WORRIED FOR NO GOOD REASON: NO, NOT AT ALL

## 2025-01-29 NOTE — PATIENT INSTRUCTIONS
TAKING GOOD CARE OF YOURSELF WHILE YOU ARE PREGNANT    What Should I Eat?  You do not have to eat a lot more food during pregnancy. But it is important to eat the right food--the most  healthy food for you and your baby. Every day, make sure you have:  6 to 8 large glasses of water.  6 to 9 servings of whole grain foods like bread or pasta. By reading the label, you will know that you are getting ‘‘whole’’ grain and not just brown-colored bread or pasta (1 slice of bread or a half cup of cooked pasta is a serving).  3 to 4 servings of fruit. Fresh, raw fruit is best (1 small apple or a half cup of chopped fruit is a serving).  4 to 5 servings of vegetables (1 medium carrot or a half cup of chopped vegetables is a serving).  2 to 3 servings of lean meat, fish, eggs, or nuts. (A piece ofmeat the size of a pack of playing cards is 1 serving.)  1 serving of vitamin C-rich food, like oranges, sweet peppers, or tomatoes (one half cup is a serving).  2 to 3 servings of iron-rich foods, like black-eyed peas, sweet potatoes, greens, dried fruit, or meat.  1 serving of a food rich in folic acid, like dark green, leafy vegetables (one half cup is a serving).    Are Some Foods Dangerous?  Most women can eat any food they want while they are pregnant. But there are some foods that can be  dangerous to the health of your baby.    Fish -- Fish is good food. And it is an important food for growing a smart baby. But some fish have lots of dangerous chemicals. To avoid these chemicals:  Do not eat swordfish, shark, ruth mackerel, or tilefish.  Eat salmon no more than 1 time per week.  Eat only ‘‘light’’ tuna. Do not eat albacore tuna.    Milk and cheese -- Dairy products are an important source of calcium, and calcium helps build strong bones and teeth. But some dairy products carry dangerous germs. To keep yourself and your baby safe, eat and drink only dairy products--such as milk, yogurt, and cheese--that are  pasteurized.    Prepared foods -- Any food that is spoiled or not cooked well can make you sick.  Do not eat any meat or fish that has not been cooked all the way through.  Do not eat any cooked food that has not been kept hot or chilled.  Wash knives, cutting boards, and your hands between handling raw meat and any other food--like fruits and vegetables--that you plan to eat raw.  Wash all fruits and vegetables with 1 tablespoon of vinegar in a pan of water to kill germs before you eat them.    Alcohol -- We know that alcohol is dangerous for your baby if you drink a lot during your pregnancy. It is safest to avoid all alcohol.    Coffee -- The most recent studies say that 2 cups of caffeinated drink each day is safe during pregnancy. This means 2 small cups of coffee or tea or 1 can of caffeinated soda.    Weight Gain  On average, the total amount of weight gain during pregnancy will fall in the following ranges:    Weight Type Average Pounds   Normal weight (BMI 18.5 - 24.9) 25 - 35 pounds   Underweight (BMI less than 18.5) 28 - 40 pounds   Overweight (BMI 25 - 29.9) 15 - 25 pounds   Obese (BMI greater or equal to 30) 11 - 20 pounds       Do I Need to Take Vitamins?  Even if your diet is good, a daily multivitamin is a good idea. All prenatal vitamins are pretty much the same,  so buy the cheapest kind. If you find that your vitamins upset your stomach, take a children’s chewable or gummy  vitamin. Be sure you get at least 400 micrograms of folic acid every day in the vitamin you chose. The number  of micrograms of folic acid is on the label of the bottle.    Is Exercise Important?  Yes! You are getting ready for an athletic event: labor! Daily exercise will help you stay fit, control your  weight, and be prepared for labor. Every day, try to get at least 30 minutes of moderate exercise like walking  or swimming. Do deep squats several times a day. This exercise will help control low back pain and help  prepare  your pelvis for delivery.    Are Some Exercises Dangerous?  You can continue to do pretty much any exercise you have been doing. It is important to avoid any danger of  blows to your stomach. You should avoid scuba diving and contact sports.    What if I Get Sick--Can I Take Medicine?  It is important to limit the medicines you take as much as possible. It is safe to take acetaminophen  (Tylenol). Avoid NSAIDs like ibuprofen (Motrin) and naproxen (Aleve).    Head cold -- Drink lots of fluids, gargle with warm salt water, take warm baths or showers, take Tylenol for headache and sore throat, suck on throat lozenges    Headaches -- Drink at least 8 big glasses of water every day, eat something healthy every 2 to 3 hours during the day, and take Tylenol    Constipation -- Drink lots of water, eat lots of fruits and vegetables, including dried fruits like prunes, and use a fiber supplement like Metamucil    Are There Danger Signs That I Need to Watch Out For?  Call your health care provider if:  You start to bleed like a period  You are leaking fluid  Your baby is not moving (after 24 weeks into your pregnancy)  You are having very bad headaches or your vision is blurry or you see ‘‘spots’’  You are having very bad pain  You are feeling very frightened or sad  You are very worried about something  NIPT testing:  NIPT Summary of Recommendations  Prenatal genetic screening (serum screening with or without nuchal translucency [NT] ultrasound or cell-free DNA screening) and diagnostic testing (chorionic villus sampling [CVS] or amniocentesis) options should be discussed and offered to all pregnant patients regardless of maternal age or risk of chromosomal abnormality. After review and discussion, every patient has the right to pursue or decline prenatal genetic screening and diagnostic testing.  If screening is accepted, patients should have one prenatal screening approach, and should not have multiple screening tests  performed simultaneously.  Cell-free DNA is the most sensitive and specific screening test for the common fetal aneuploidies. Nevertheless, it has the potential for false-positive and false-negative results. Furthermore, cell-free DNA testing is not equivalent to diagnostic testing.  All patients should be offered a second-trimester ultrasound for fetal structural defects, since these may occur with or without fetal aneuploidy; ideally this procedure is performed between 18 and 22 weeks of gestation (with or without second?trimester maternal serum alpha?fetoprotein).  Patients with a positive screening test result for fetal aneuploidy should undergo genetic counseling and a comprehensive ultrasound evaluation with an opportunity for diagnostic testing to confirm results.  Patients with a negative screening test result should be made aware that this substantially decreases their risk of the targeted aneuploidy but does not ensure that the fetus is unaffected. The potential for a fetus to be affected by genetic disorders that are not evaluated by the screening or diagnostic test should also be reviewed. Even if patients have a negative screening test result, they may choose diagnostic testing later in pregnancy, particularly if additional findings such as fetal anomalies identified on ultrasound examination become evident.  Patients whose cell-free DNA screening test results are not reported by the laboratory or are uninterpretable (a no?call test result) should be informed that test failure is associated with an increased risk of aneuploidy, receive further genetic counseling and be offered comprehensive ultrasound evaluation and diagnostic testing.  If an enlarged nuchal translucency or an anomaly is identified on ultrasound examination, the patient should be offered genetic counseling and diagnostic testing for genetic conditions and a comprehensive ultrasound evaluation including detailed ultrasonography at 18-22  weeks of gestation to assess for structural abnormalities.  The use of cell-free DNA screening as follow-up for patients with a screen positive serum analyte screening test result is an option for patients who want to avoid a diagnostic test. However, patients should be informed that this approach may delay definitive diagnosis and will fail to identify some fetuses with chromosomal abnormalities.  In clinical situations of an isolated soft ultrasonographic marker (such as echogenic cardiac focus, choroid plexus cyst, pyelectasis, short humerus or femur length) where aneuploidy screening has not been performed, the patient should be counseled regarding the risk of aneuploidy associated with the finding and cell-free DNA, quad screen testing, or amniocentesis should be offered. If aneuploidy testing is performed and is low risk, then no further risk assessment is needed. If more than one marker is identified, then genetic counseling, maternal-fetal medicine consultation, or both are recommended.  No method of aneuploidy screening that includes a serum sample is as accurate in twin gestations as it is in sarmiento pregnancies; this information should be incorporated into pretest counseling for patients with multiple gestations.  Cell-free DNA screening can be performed in twin pregnancies. Overall, performance of screening for trisomy 21 by cell-free DNA in twin pregnancies is encouraging, but the total number of reported affected cases is small. Given the small number of affected cases it is difficult to determine an accurate detection rate for trisomy 18 and 13.  Because preimplantation genetic testing is not uniformly accurate, prenatal screening and prenatal diagnosis should be offered to all patients regardless of previous preimplantation genetic testing.  The use of multiple serum screening approaches performed independently (eg, a first-trimester screening test followed by a quad screen as an unlinked test) is  not recommended because it will result in an unacceptably high positive screening rate and could deliver contradictory risk estimates.  In multifetal gestations, if a fetal demise, vanishing twin, or anomaly is identified in one fetus, there is a significant risk of an inaccurate test result if serum-based aneuploidy screening or cell-free DNA is used. This information should be reviewed with the patient and diagnostic testing should be offered.  Patients with unusual or multiple aneuploidies detected by cell-free DNA should be referred for genetic counseling and maternal-fetal medicine consultation.  Our contact information is below in case you or your family need to call:  Your health care provider’s name: UILSES Garrett  Your health care provider’s phone number: [phone]

## 2025-01-29 NOTE — PROGRESS NOTES
Assessment   Diagnoses and all orders for this visit:  Encounter for (NT) nuchal translucency scan ([facility])  -     US MAC OB imaging order; Future  Screening for cervical cancer  -     THINPREP PAP TEST (>30)  Prenatal care, antepartum ([facility])  -     CBC Anemia Panel With Reflex,Pregnancy; Future  -     aspirin 81 mg chewable tablet; Chew 1 tablet (81 mg) once daily.      Plan   - New OB resources provided and reviewed with particular attention to dietary, travel, and medication restrictions  - Oriented to practice, CNM vs. MD care  - Reviewed bleeding precautions, warning signs, when to call provider; phone number provided  - Routine NOB labs ordered  - Return in 4 weeks for routine prenatal care    Mary De Jesus, APRN-CNM    Subjective   Morena Franco is a 29 y.o.  at 9w1d with a working estimated date of delivery of 2025, by Last Menstrual Period who presents for an initial prenatal visit. This pregnancy is planned.    Patient currently experiencing: nausea, currently taking B6  Bleeding or cramping since LMP: no  Taking prenatal vitamin: Yes  Ultrasound completed this pregnancy: Yes - done at fertility center    Last pap:     OB History    Para Term  AB Living   1 0 0 0 0 0   SAB IAB Ectopic Multiple Live Births   0 0 0 0 0      # Outcome Date GA Lbr Blu/2nd Weight Sex Type Anes PTL Lv   1 Current              [place]  Depression Scale Total: 0  Prior pregnancy complications:  N/A    History of hypertension:  No  H/O HSV: yes, plan Valtrex at 36 weeks   Varicella vaccinated or past infection: Yes  H/O blood clot personal or family: No  Family H/O CHD:  Yes  Plans to breast or bottle feed: both  Family H/O chromosomal abnormality: No  Interested in genetic screening: Yes    Past Medical History:   Diagnosis Date    Herpesviral infection of urogenital system, unspecified 2017    Recurrent genital herpes simplex type 2 infection      No past surgical history on  file.   Social History     Socioeconomic History    Marital status:    Tobacco Use    Smoking status: Never    Smokeless tobacco: Never   Substance and Sexual Activity    Alcohol use: Never    Drug use: Never     Social Drivers of Health     Financial Resource Strain: Patient Declined (2024)    Received from [facility]    Overall Financial Resource Strain (CARDIA)     Difficulty of Paying Living Expenses: Patient declined   Food Insecurity: Patient Declined (2024)    Received from [facility]    Hunger Vital Sign     Worried About Running Out of Food in the Last Year: Patient declined     Ran Out of Food in the Last Year: Patient declined   Transportation Needs: Patient Declined (2024)    Received from [facility]    PRAPARE - Transportation     Lack of Transportation (Medical): Patient declined     Lack of Transportation (Non-Medical): Patient declined   Physical Activity: Insufficiently Active (2024)    Received from [facility]    Exercise Vital Sign     Days of Exercise per Week: 3 days     Minutes of Exercise per Session: 40 min   Stress: No Stress Concern Present (2024)    Received from [facility]    Citizen of [place] of Occupational Health - Occupational Stress Questionnaire     Feeling of Stress : Only a little   Social Connections: Patient Declined (2024)    Received from [facility]    Social Connection and Isolation Panel [NHANES]     Frequency of Communication with Friends and Family: Patient declined     Frequency of Social Gatherings with Friends and Family: Patient declined     Attends Buddhist Services: Patient declined     Active Member of Clubs or Organizations: Patient declined     Attends Club or Organization Meetings: Patient declined     Marital Status: Patient declined        Objective   Physical Exam  Weight: 86.3 kg (190 lb 3.2 oz)  TW.627 kg (10 lb 3.2 oz)   Expected Total Weight Gain: 5 kg (11 lb)-9 kg (19 lb)   Pregravid  BMI: 30.88  BP: 133/76   Moderate risk  Women with at least two moderate risk factors including:  o Nulliparity  o Obesity (BMI > 30)  o Mother or sister with a history of PEC/eclampsia/HELLP  o  race  o Age >= 35 years  o Previous pregnancy with Low Birth Weight (LBW) or Small for Gestational  Age (SGA) infant  o Previous adverse pregnancy outcome (stillbirth)  o Pregnancy interval > 10 years  b) High risk  ? Women with at least one high risk factor including:  o Any history of PEC/eclampsia/HELLP  o Multifetal gestation  o Chronic Hypertension  o Pre-existing type 1 or 2 diabetes  o Renal disease with proteinuria (P:C >= 0.3 mg/mg) or serum CR >= 1 mg/dL  o Autoimmune disease (systemic lupus erythematosus, antiphospholipid  syndrome)  c) Additional indications per provider discretion    Review of Systems   Respiratory: Negative.     Cardiovascular: Negative.    Gastrointestinal:  Positive for nausea and vomiting.   Genitourinary:  Negative for decreased urine volume, difficulty urinating, dyspareunia, dysuria, enuresis, flank pain, frequency, genital sores, hematuria, menstrual problem, pelvic pain, urgency, vaginal bleeding, vaginal discharge and vaginal pain.   Psychiatric/Behavioral: Negative.          Physical Exam  Constitutional:       Appearance: Normal appearance.   Cardiovascular:      Rate and Rhythm: Normal rate.   Pulmonary:      Effort: Pulmonary effort is normal.   Musculoskeletal:         General: Normal range of motion.      Cervical back: Normal range of motion.   Neurological:      General: No focal deficit present.      Mental Status: She is alert and oriented to person, place, and time. Mental status is at baseline.   Skin:     General: Skin is warm.   Psychiatric:         Mood and Affect: Mood normal.         Behavior: Behavior normal.         Thought Content: Thought content normal.         Judgment: Judgment normal.          Postpartum Depression: Low Risk  (1/29/2025)     [place]  Depression Scale     Last EPDS Total Score: 0     Last EPDS Self Harm Result: Never        Pregnancy Problems (from 25 to present)       No problems associated with this episode.             Education provided:   Avoidance of alcohol, tobacco and drug use   Dietary restrictions reviewed including avoiding raw or poorly cooked meat, lunch meat and soft cheeses  3.    Adequate water intake.  Avoid empty calories with juices  4.    Recommendation for weight gain based on initial BMI (body mass index)  5.    Limit caffeine to less than 200-300 mg/day  6.    Take folic acid 400 mcg daily.  Incorporate 5,000u Vitamin D3 per day.  Discuss Magnesium Supplementation  7.    Importance of good sleep hygiene and avoidance of laying on back after 15 weeks  8.    Encourage daily physical activity of 30 minutes a day the majority of the days of the week  9.    Discussed normal physiologic changes:  Round ligament pain, nausea, breast tenderness  10.  Discussed natural remedies, vitamins and prescription medications for nausea  11.  Baby aspirin 162mg daily (two baby aspirin) for the reduction of pre-eclampsia during pregnancy.  Even if you have          never had any blood pressure issues, you can develop hypertension during your pregnancy.  This has been well          Studied and safe to take starting at 12 weeks gestation until after the birth of your baby.    **IF AT ANYTIME DURING YOUR PREGNANCY YOU HAVE CONCERNS THAT YOU CANNOT AFFORD HEALTHY FOOD PLEASE LET US KNOW!**  We have a Food for Life program and would be happy to place a referral for you.  It is so important to eat healthy during your pregnancy and we treat food as medicine.  Healthy food is expensive!  This program will allow you and your family up to 4 to receive food and recipes for one week per month.  This needs to be renewed every 6 months.    Ultrasound and screening for aneuploidies (Down Syndrome/Trisomy 21, Trisomy 13 + 18)  A  requisition has been placed for a dating ultrasound.  Please call to get that scheduled.    At this ultrasound they will ask you if your would like to proceed with screening for genetic disorders that are listed above.  They will do that with an ultrasound at approximately 13 weeks and we also draw blood work for screening which includes the fetal sex if you desire to know.    Ultrasound for anatomy will be done at 19 weeks.  Based on risk factors and any concerns the maternal fetal medicine provider has, you may need a repeat ultrasound.  Healthy pregnancies that do not have any other concerns by the midwife or maternal fetal medicine do not have any repeat ultrasounds done.    Labs:   An order will be placed for your new ob labs.  Please get those done at the time of your ultrasound.  They will collect          multiple tubes of blood for new ob labs and also urine for STI testing and a urine culture.   If there are any concerns with any blood work or urine testing WE WILL CALL YOU OR COMMUNICATE VIA          [id number]!!!   The biggest concerns our patients have is when they see their complete blood count.  The reference          range in the result is for a non pregnant person!  We will notify you if there is any need to start an iron supplement.  3.    At 26-28 weeks a glucose test is ordered to see if you have gestational diabetes.  We also reassess if you have          Anemia, which can be common in pregnancy  4.    Group B strep culture will be done at 36 weeks gestation.    We also recommend that you be screened once in your life for Cystic Fibrosis and Spinal Muscular Atrophy.  This assesses if we need your partner to be tested if you are a carrier of a gene that can be passed along to your baby.  For this to happen, both parents must be a carrier to the gene.    Choices for care and hospital for birth:  I am a Certified Nurse Midwife and practice in a group setting, which means that any of the midwives in my  "group practice may be there for your birth.  We care for healthy females during pregnancy and labor/birth.  We practice in collaboration with physicians within our group.  If there are any concerns with your pregnancy, labor or birth our physicians work closely with us.      The midwives in our practice strongly encourage you to explore the option of Centering Pregnancy which has been studied for better birth outcomes!  Care will be done in a group setting with 1:1 time with a midwife and then in depth education about every stage of your pregnancy, labor/birth,  care, feeding choices, pediatrician options, birth control and coping techniques for the first few weeks after birth.  We have day groups and our [place] location and a Monday evening group at [facility].  The groups follow your normal prenatal schedule and yes, we keep in contact and I see you at the end of your pregnancy.    There are certain medical conditions that \"risks you out\" of midwifery care that we are constantly assessing for.  Some conditions during your pregnancy that would risk you out of midwifery care would be:   Severe growth restriction of your baby   Labor/Birth  less than 35 weeks   Severe pre-eclampsia at any time during your pregnancy/labor/birth   Gestational Diabetes needing medication (insulin) to control your blood sugars   If you decline or do not complete your glucose testing to rule out diet controlled diabetes by 32 weeks   If you are diagnosed with chronic hypertension during your pregnancy and need to start medication    The options for birth where we provide 24/7 Certified Nurse Midwifery coverage with a board certified OB/GYN, in house anesthesia and neonataology coverage are:    [facility] at [place], [place]    To call for questions regarding your care of if you are in labor is [phone]  My nurses name is Yecenia Vasquez " "who can answer questions and keep me updated should any questions or concerns arise during your pregnancy.    After hours, the answering service will ask you where you intended to give birth and connect you to the midwife on call at [facility] or the [facility].    If you would like to tour either facility:  Please call the Childbirth education line at [phone]    Danger signs to report:  Seek medical care immediately if you have pain that is doubling you over or vaginal bleeding that is heavier than a  period  Notify the office should you have any burning, urgency, frequency of urination or other concerning symptoms.    Medications that are safe for common discomforts of pregnancy:   Tylenol   Tums or Papaya extract for any upset stomach or heartburn   Zyrtec, Claritin, Benadryl for allergy symptoms   Sudafed or Robitussin for cold symptoms  [employer] is an maite that is great for what medications are safe to take throughout your pregnancy and breastfeeding journey through the first year of life!  Well worth the $3.99    Work restrictions:  A normal healthy pregnancy without any complications are able to have the standard pregnancy work restrictions which is no pushing/pulling/lifting greater than 25 pounds independently    FMLA paperwork  Can be brought to the office for us to fill out for when you are starting your maternity leave (either your scheduled date of going to the hospital or your due date).  We cannot give out early FMLA when there is no documented medical conditions that are considered \"normal pregnancy\" events.    Comfort measures   Chiropractors are great for alleviation of ligament pain   Yoga is good for your ligaments and mentally preparing for baby and labor.  A prenatal yoga class is recommended.  3.     Prenatal massages are fine  4.     A maternity support belt is an amazing thing that can help ligament pain -- can be purchased on Amazon  5.     Good supportive " shoes are key to helping with ligament pain    Dental care  It is very important to see a dentist during your pregnancy for routine cleaning and also if you develop any dental pain during your pregnancy.  Healthy gums and teeth are very important during your pregnancy.  We can provide you with a dental letter if your dentist would like one.    Thank you for choosing our practice and [facility] for you healthcare!  I am excited to partner with you during this very special time!     If there is anything I can do to help make your experience is positive, please come to your visits with questions and concerns and do not be afraid to ask what is on your mind!  We will see you in the office every 4 weeks until you are 30 weeks, then every 2 weeks until 36 weeks and then weekly until your baby is born.    Until then, be well!  Mary De Jesus, ULISES

## 2025-01-30 ENCOUNTER — APPOINTMENT (OUTPATIENT)
Dept: OBSTETRICS AND GYNECOLOGY | Facility: CLINIC | Age: 30
End: 2025-01-30
Payer: COMMERCIAL

## 2025-02-20 ENCOUNTER — TELEPHONE (OUTPATIENT)
Dept: OBSTETRICS AND GYNECOLOGY | Facility: CLINIC | Age: 30
End: 2025-02-20
Payer: COMMERCIAL

## 2025-02-20 NOTE — TELEPHONE ENCOUNTER
Name/ verified  Pt is 12.2 wks gest  She C/O Flu like symptoms since having body aches, headache, congestion, fever highest 101.3 that goes down to 98.7 with Tylenol. Juan N/V. Urinating without difficulties and having normal BM.  Advised to increase fluid intake, get rest, may use Tylenol every 4-6hrs as needed. She may use Sudafed Sinus or Sudafed as needed. She may also use Ayr saline nasal drops or mist for congestion as well.  Patient denies abdominal pain or any vaginal bleeding at this time.  Advised pt to go to ER if symptoms worsen or if her fever does not go down with Tylenol.  Reviewed with Dr. Acosta and no further orders given at this time.  Pt will continue to monitor.  Pt verbalized understanding.

## 2025-02-20 NOTE — TELEPHONE ENCOUNTER
Patient is 12wk pregnant and has been having gas, cramping and a fever. She has a cold and has been taking tylenol for her fever but it is will go down then spike up. She says it hurts to walk also. Please call ASAP. She is very nervous. Thanks!

## 2025-02-25 PROBLEM — Z3A.13 13 WEEKS GESTATION OF PREGNANCY (HHS-HCC): Status: ACTIVE | Noted: 2025-01-29

## 2025-02-25 NOTE — PROGRESS NOTES
Assessment/Plan   Diagnoses and all orders for this visit:  Family history of congenital heart defect  Pregnancy with history of infertility, antepartum ([facility])      Anatomy US scheduled 16 weeks per M  Reviewed routine lab results no concerns  , CBC  not yet collected, plan to be drawn before her next visit.  Reviewed warning signs of PLT, labor, baby movement, leaking, bleeding, contractions and when to call provider.  Follow up in 4 weeks for a routine prenatal visit.    ULISES Garrett    Subjective   Morena Franco is a 29 y.o.  at 13w0d with a working estimated date of delivery of 2025, by Last Menstrual Period who presents for a routine prenatal visit. She denies vaginal bleeding, abdominal pain, leakage of fluid.     Her pregnancy is complicated by:  Pregnancy Problems (from 25 to present)       Problem Noted Diagnosed Resolved    9 weeks gestation of pregnancy ([facility]) 2025 by ULISES Garrett  No    Priority:  Medium       Overview Addendum 2025  4:10 PM by ULISES Garrett     2025: Desired provider in labor: [] CNM  [] Physician   [x] Either Acceptable  [x] Blood Products: [x] Yes, accepts [] No, needs counseling  [x] Initial BMI: 30.88   [x] Prenatal Labs:   [x] Cervical Cancer Screening up to date: pt requests to complete postpartum   [x] Rh status: O pos  [x] Screen for IPV and Substance Use Risk:  [x] Genetic Screening (cfDNA):  Ordered: Low risk Male   [x] First Trimester Anatomy Screen (11-13.6 wks): Scheduled   [x] Baby ASA (initiated):  [x] Pregnancy dated by: CRL    [] Anatomy US: (19-20 wks)  [] Federal Sterilization consent signed (if indicated):  [] 1hr GCT at 24-28wks:  [] Rhogam (if indicated):   [] Fetal Surveillance (if indicated):  [] Tdap (27-32 wks, may be given up to 36 wks if initial window missed):   [] RSV (32-36 wks) (Sept. to end of ):     [x] Feeding Intentions: breast/bottle  [] Postpartum Birth control  method:   [] GBS at 36 - 37 wks:  [] 39 weeks discussion of IOL vs. Expectant management:  [] Mode of delivery ( anticipated ):               Pregnancy with history of infertility, antepartum ([facility]) 2025 by ULISES Garrett  No    Priority:  Medium       Overview Signed 2025  9:05 AM by ULISES Garrett     2025: Pregnancy achieved via Clomid. SD           Family history of congenital heart defect 2025 by ULISES Garrett  No    Priority:  Medium       Overview Addendum 2025  9:37 AM by ULISES Garrett     2025: FOB brother born with transposition of the great arteries. Plan early anatomy and fetal echo. FOB born with kidney agenesis. SD           Herpes simplex virus type 2 (HSV-2) infection affecting pregnancy in first trimester ([facility]) 2025 by ULISES Garrett  No    Priority:  Medium       Overview Signed 2025  9:41 AM by ULISES Garrett     2025: Plan Valtrex at 36 weeks. SD           Anxiety disorder in mother affecting childbirth ([facility]) 2025 by ULISES Garrett  No    Priority:  Medium       Overview Signed 2025  9:43 AM by ULISES Garrett     2025: Taking daily medication and doing well at this time, plans to continue. SD                    Objective   Physical Exam   , Pregravid BMI: 30.88  TW.627 kg (10 lb 3.2 oz)   Expected Total Weight Gain: 5 kg (11 lb)-9 kg (19 lb)             Postpartum Depression: Low Risk  (2025)    [place]  Depression Scale     Last EPDS Total Score: 0     Last EPDS Self Harm Result: Never        Prenatal Labs  Lab Results   Component Value Date    ABO O 10/07/2024    LABRH POS 10/07/2024    NEISSGONOAMP Negative 10/07/2024    CHLAMTRACAMP Negative 10/07/2024    SYPHT Nonreactive 10/07/2024    HEPBSAG Nonreactive 10/07/2024    HIV1X2 Nonreactive 10/07/2024     NIPT: Low risk male     Imaging  Anatomy sono  scheduled     Mary De Jesus, APRN-CNM

## 2025-02-26 ENCOUNTER — ROUTINE PRENATAL (OUTPATIENT)
Dept: OBSTETRICS AND GYNECOLOGY | Facility: CLINIC | Age: 30
End: 2025-02-26
Payer: COMMERCIAL

## 2025-02-26 VITALS — DIASTOLIC BLOOD PRESSURE: 78 MMHG | BODY MASS INDEX: 32.61 KG/M2 | SYSTOLIC BLOOD PRESSURE: 124 MMHG | WEIGHT: 190 LBS

## 2025-02-26 DIAGNOSIS — Z82.79 FAMILY HISTORY OF CONGENITAL HEART DEFECT: Primary | ICD-10-CM

## 2025-02-26 DIAGNOSIS — O09.00 PREGNANCY WITH HISTORY OF INFERTILITY, ANTEPARTUM (HHS-HCC): ICD-10-CM

## 2025-02-26 PROCEDURE — 0501F PRENATAL FLOW SHEET: CPT | Performed by: ADVANCED PRACTICE MIDWIFE

## 2025-03-03 ENCOUNTER — HOSPITAL ENCOUNTER (EMERGENCY)
Facility: HOSPITAL | Age: 30
Discharge: HOME | End: 2025-03-03
Payer: COMMERCIAL

## 2025-03-03 VITALS
RESPIRATION RATE: 16 BRPM | WEIGHT: 191 LBS | HEIGHT: 65 IN | BODY MASS INDEX: 31.82 KG/M2 | OXYGEN SATURATION: 99 % | DIASTOLIC BLOOD PRESSURE: 77 MMHG | TEMPERATURE: 97.4 F | SYSTOLIC BLOOD PRESSURE: 115 MMHG | HEART RATE: 123 BPM

## 2025-03-03 DIAGNOSIS — R82.71 ASYMPTOMATIC BACTERIURIA DURING PREGNANCY (HHS-HCC): ICD-10-CM

## 2025-03-03 DIAGNOSIS — R19.7 DIARRHEA, UNSPECIFIED TYPE: ICD-10-CM

## 2025-03-03 DIAGNOSIS — R11.2 NAUSEA AND VOMITING, UNSPECIFIED VOMITING TYPE: Primary | ICD-10-CM

## 2025-03-03 DIAGNOSIS — O99.891 ASYMPTOMATIC BACTERIURIA DURING PREGNANCY (HHS-HCC): ICD-10-CM

## 2025-03-03 LAB
ALBUMIN SERPL BCP-MCNC: 3.8 G/DL (ref 3.4–5)
ALP SERPL-CCNC: 70 U/L (ref 33–110)
ALT SERPL W P-5'-P-CCNC: 31 U/L (ref 7–45)
ANION GAP SERPL CALC-SCNC: 13 MMOL/L (ref 10–20)
APPEARANCE UR: CLEAR
AST SERPL W P-5'-P-CCNC: 19 U/L (ref 9–39)
BACTERIA #/AREA URNS AUTO: ABNORMAL /HPF
BASOPHILS # BLD AUTO: 0.01 X10*3/UL (ref 0–0.1)
BASOPHILS NFR BLD AUTO: 0.1 %
BILIRUB SERPL-MCNC: 0.7 MG/DL (ref 0–1.2)
BILIRUB UR STRIP.AUTO-MCNC: NEGATIVE MG/DL
BUN SERPL-MCNC: 12 MG/DL (ref 6–23)
CALCIUM SERPL-MCNC: 8.3 MG/DL (ref 8.6–10.3)
CHLORIDE SERPL-SCNC: 103 MMOL/L (ref 98–107)
CO2 SERPL-SCNC: 22 MMOL/L (ref 21–32)
COLOR UR: YELLOW
CREAT SERPL-MCNC: 0.48 MG/DL (ref 0.5–1.05)
EGFRCR SERPLBLD CKD-EPI 2021: >90 ML/MIN/1.73M*2
EOSINOPHIL # BLD AUTO: 0 X10*3/UL (ref 0–0.7)
EOSINOPHIL NFR BLD AUTO: 0 %
ERYTHROCYTE [DISTWIDTH] IN BLOOD BY AUTOMATED COUNT: 12.2 % (ref 11.5–14.5)
GLUCOSE SERPL-MCNC: 106 MG/DL (ref 74–99)
GLUCOSE UR STRIP.AUTO-MCNC: NORMAL MG/DL
HCT VFR BLD AUTO: 41 % (ref 36–46)
HGB BLD-MCNC: 14 G/DL (ref 12–16)
IMM GRANULOCYTES # BLD AUTO: 0.05 X10*3/UL (ref 0–0.7)
IMM GRANULOCYTES NFR BLD AUTO: 0.5 % (ref 0–0.9)
KETONES UR STRIP.AUTO-MCNC: ABNORMAL MG/DL
LEUKOCYTE ESTERASE UR QL STRIP.AUTO: ABNORMAL
LYMPHOCYTES # BLD AUTO: 0.22 X10*3/UL (ref 1.2–4.8)
LYMPHOCYTES NFR BLD AUTO: 2 %
MCH RBC QN AUTO: 29.7 PG (ref 26–34)
MCHC RBC AUTO-ENTMCNC: 34.1 G/DL (ref 32–36)
MCV RBC AUTO: 87 FL (ref 80–100)
MONOCYTES # BLD AUTO: 0.37 X10*3/UL (ref 0.1–1)
MONOCYTES NFR BLD AUTO: 3.4 %
MUCOUS THREADS #/AREA URNS AUTO: ABNORMAL /LPF
NEUTROPHILS # BLD AUTO: 10.37 X10*3/UL (ref 1.2–7.7)
NEUTROPHILS NFR BLD AUTO: 94 %
NITRITE UR QL STRIP.AUTO: NEGATIVE
NRBC BLD-RTO: 0 /100 WBCS (ref 0–0)
PH UR STRIP.AUTO: 6 [PH]
PLATELET # BLD AUTO: 297 X10*3/UL (ref 150–450)
POTASSIUM SERPL-SCNC: 3.3 MMOL/L (ref 3.5–5.3)
PROT SERPL-MCNC: 6.8 G/DL (ref 6.4–8.2)
PROT UR STRIP.AUTO-MCNC: ABNORMAL MG/DL
RBC # BLD AUTO: 4.72 X10*6/UL (ref 4–5.2)
RBC # UR STRIP.AUTO: NEGATIVE MG/DL
RBC #/AREA URNS AUTO: ABNORMAL /HPF
SODIUM SERPL-SCNC: 135 MMOL/L (ref 136–145)
SP GR UR STRIP.AUTO: 1.03
SQUAMOUS #/AREA URNS AUTO: ABNORMAL /HPF
UROBILINOGEN UR STRIP.AUTO-MCNC: NORMAL MG/DL
WBC # BLD AUTO: 11 X10*3/UL (ref 4.4–11.3)
WBC #/AREA URNS AUTO: ABNORMAL /HPF

## 2025-03-03 PROCEDURE — 85025 COMPLETE CBC W/AUTO DIFF WBC: CPT | Performed by: EMERGENCY MEDICINE

## 2025-03-03 PROCEDURE — 80053 COMPREHEN METABOLIC PANEL: CPT | Performed by: EMERGENCY MEDICINE

## 2025-03-03 PROCEDURE — 99284 EMERGENCY DEPT VISIT MOD MDM: CPT | Mod: 25

## 2025-03-03 PROCEDURE — 36415 COLL VENOUS BLD VENIPUNCTURE: CPT | Performed by: EMERGENCY MEDICINE

## 2025-03-03 PROCEDURE — 96374 THER/PROPH/DIAG INJ IV PUSH: CPT

## 2025-03-03 PROCEDURE — 81001 URINALYSIS AUTO W/SCOPE: CPT | Performed by: EMERGENCY MEDICINE

## 2025-03-03 PROCEDURE — 2500000004 HC RX 250 GENERAL PHARMACY W/ HCPCS (ALT 636 FOR OP/ED): Performed by: EMERGENCY MEDICINE

## 2025-03-03 PROCEDURE — 87086 URINE CULTURE/COLONY COUNT: CPT | Mod: AHULAB | Performed by: EMERGENCY MEDICINE

## 2025-03-03 RX ORDER — LOPERAMIDE HYDROCHLORIDE 2 MG/1
2 CAPSULE ORAL 4 TIMES DAILY PRN
Qty: 20 CAPSULE | Refills: 0 | Status: SHIPPED | OUTPATIENT
Start: 2025-03-03 | End: 2025-03-08

## 2025-03-03 RX ORDER — CEPHALEXIN 500 MG/1
500 CAPSULE ORAL 4 TIMES DAILY
Qty: 20 CAPSULE | Refills: 0 | Status: SHIPPED | OUTPATIENT
Start: 2025-03-03 | End: 2025-03-08

## 2025-03-03 RX ORDER — ONDANSETRON HYDROCHLORIDE 8 MG/1
8 TABLET, FILM COATED ORAL EVERY 8 HOURS PRN
Qty: 15 TABLET | Refills: 0 | Status: SHIPPED | OUTPATIENT
Start: 2025-03-03

## 2025-03-03 RX ORDER — ONDANSETRON HYDROCHLORIDE 2 MG/ML
4 INJECTION, SOLUTION INTRAVENOUS ONCE
Status: COMPLETED | OUTPATIENT
Start: 2025-03-03 | End: 2025-03-03

## 2025-03-03 RX ORDER — LOPERAMIDE HYDROCHLORIDE 2 MG/1
4 CAPSULE ORAL ONCE
Status: DISCONTINUED | OUTPATIENT
Start: 2025-03-03 | End: 2025-03-03 | Stop reason: HOSPADM

## 2025-03-03 RX ADMIN — ONDANSETRON 4 MG: 2 INJECTION, SOLUTION INTRAMUSCULAR; INTRAVENOUS at 15:32

## 2025-03-03 ASSESSMENT — PAIN DESCRIPTION - DESCRIPTORS: DESCRIPTORS: CRAMPING

## 2025-03-03 ASSESSMENT — PAIN - FUNCTIONAL ASSESSMENT: PAIN_FUNCTIONAL_ASSESSMENT: 0-10

## 2025-03-03 ASSESSMENT — PAIN DESCRIPTION - LOCATION: LOCATION: ABDOMEN

## 2025-03-03 ASSESSMENT — PAIN SCALES - GENERAL: PAINLEVEL_OUTOF10: 4

## 2025-03-03 NOTE — TELEPHONE ENCOUNTER
Patient is 14wk pregnant and since 4am this morning she has had diarrhea and vomiting. The vomiting is currently a bile consistency. Please call ASAP.

## 2025-03-03 NOTE — ED TRIAGE NOTES
TRIAGE NOTE   I saw the patient as the Clinician in Triage and performed a brief history and physical exam, established acuity, and ordered appropriate tests to develop basic plan of care. Patient will be seen by an LARRY, resident and/or physician who will independently evaluate the patient. Please see subsequent provider notes for further details and disposition.     Chief complaint: Vomiting, and diarrhea    History of present illness: Patient is a 30-year-old female who is 14 weeks pregnant presenting to the emergency department complaints of diarrhea.  According to the patient, her symptoms started at 4:00 this morning.  The patient states that she is 14 weeks pregnant despite this, the patient has not experienced the quickening yet.  The patient denies any fever with this.  The patient denies any recent travel or antibiotic use.  She denies any blood in her stool.  The patient states that she has had no vaginal bleeding or discharge recently she denies any dysuria.  Concern, the patient presents to the emergency department for further evaluation.    Physical examination: General: Patient is an age-appropriate well-appearing female resting comfortably in the examination table  Cardiovascular: Patient has a regular tachycardia  Lungs: Lungs are clear to auscultation bilaterally  Abdomen: Patient's abdomen is soft nontender nondistended.  Patient has normal bowel sounds. There is no guarding or rebound tenderness.  Neuro: Patient is alert she moves all 4 extremities spontaneously there are no lateralizing neurologic deficits cranial nerves III through XII are intact.    Medical Decision Making: Patient presents to the emergency department with complaints of diarrhea in the setting of pregnancy.  I ordered basic blood work on the patient as well as a urinalysis.  I used a bedside ultrasonography to verify fetal heart rate of 178 bpm.  Patient expressed relief with these findings.

## 2025-03-03 NOTE — ED TRIAGE NOTES
Pt presents to the ED from home with c/o vomiting during pregnancy. Pt is 14 weeks pregnant and started to vomit last night at 0400 and hasn't been able to keep anything down since.

## 2025-03-03 NOTE — ED PROVIDER NOTES
HPI   Chief Complaint   Patient presents with    Vomiting During Pregnancy       HPI        Patient History   Past Medical History:   Diagnosis Date    Herpesviral infection of urogenital system, unspecified 01/26/2017    Recurrent genital herpes simplex type 2 infection     No past surgical history on file.  No family history on file.  Social History     Tobacco Use    Smoking status: Never    Smokeless tobacco: Never   Substance Use Topics    Alcohol use: Never    Drug use: Never       Physical Exam   ED Triage Vitals   Temperature Heart Rate Respirations BP   03/03/25 1308 03/03/25 1308 03/03/25 1308 03/03/25 1308   37.1 °C (98.8 °F) (!) 121 16 121/68      Pulse Ox Temp Source Heart Rate Source Patient Position   03/03/25 1308 03/03/25 1308 03/03/25 1514 --   100 % Temporal Monitor       BP Location FiO2 (%)     03/03/25 1514 --     Right arm        Physical Exam      ED Course & MDM                  No data recorded     Hui Coma Scale Score: 15 (03/03/25 1309 : Laura Shen RN)                           Medical Decision Making      Procedure  Procedures

## 2025-03-03 NOTE — TELEPHONE ENCOUNTER
"RN called patient back  Verified name and   Patient states that at 4am she started having diarrhea that is \"straight liquid\"  She states she has had consistent stomach pain and cramping.  She also states that she has been thrownig up and it is bile consistency.   Patient states she has thrown up every hour and had diarrhea every 30min. She states she cannot keep fluids down and has not kept down water since 2100 last night.   She tried to take loperamide and threw it up  Denies any spotting or leakage of fluid, but endorses abdominal cramping (maybe GI related)  RN encouraged patient to present to the emergency room for evaluation and rehydration  Patient understood recommendations.   All questions and concerns were addressed at the time of the call.     Charlette ORTIZN, RN      "

## 2025-03-05 PROBLEM — O28.5 ABNORMAL GENETIC TEST DURING PREGNANCY: Status: ACTIVE | Noted: 2025-03-05

## 2025-03-06 LAB — BACTERIA UR CULT: NORMAL

## 2025-03-18 ENCOUNTER — APPOINTMENT (OUTPATIENT)
Dept: RADIOLOGY | Facility: CLINIC | Age: 30
End: 2025-03-18
Payer: COMMERCIAL

## 2025-03-18 ENCOUNTER — HOSPITAL ENCOUNTER (OUTPATIENT)
Dept: RADIOLOGY | Facility: CLINIC | Age: 30
Discharge: HOME | End: 2025-03-18
Payer: COMMERCIAL

## 2025-03-18 DIAGNOSIS — Z36.82 ENCOUNTER FOR (NT) NUCHAL TRANSLUCENCY SCAN (HHS-HCC): ICD-10-CM

## 2025-03-18 DIAGNOSIS — O99.212 OBESITY AFFECTING PREGNANCY IN SECOND TRIMESTER (HHS-HCC): ICD-10-CM

## 2025-03-18 DIAGNOSIS — O35.2XX0: ICD-10-CM

## 2025-03-18 PROCEDURE — 76815 OB US LIMITED FETUS(S): CPT

## 2025-03-18 PROCEDURE — 76815 OB US LIMITED FETUS(S): CPT | Performed by: STUDENT IN AN ORGANIZED HEALTH CARE EDUCATION/TRAINING PROGRAM

## 2025-03-19 ENCOUNTER — ROUTINE PRENATAL (OUTPATIENT)
Dept: OBSTETRICS AND GYNECOLOGY | Facility: CLINIC | Age: 30
End: 2025-03-19
Payer: COMMERCIAL

## 2025-03-19 VITALS — DIASTOLIC BLOOD PRESSURE: 69 MMHG | SYSTOLIC BLOOD PRESSURE: 121 MMHG | BODY MASS INDEX: 32.79 KG/M2 | WEIGHT: 194 LBS

## 2025-03-19 DIAGNOSIS — B00.9 HERPES SIMPLEX VIRUS TYPE 2 (HSV-2) INFECTION AFFECTING PREGNANCY IN FIRST TRIMESTER (HHS-HCC): ICD-10-CM

## 2025-03-19 DIAGNOSIS — Z3A.16 16 WEEKS GESTATION OF PREGNANCY (HHS-HCC): ICD-10-CM

## 2025-03-19 DIAGNOSIS — Z82.79 FAMILY HISTORY OF CONGENITAL HEART DEFECT: Primary | ICD-10-CM

## 2025-03-19 DIAGNOSIS — O28.5 ABNORMAL GENETIC TEST DURING PREGNANCY: ICD-10-CM

## 2025-03-19 DIAGNOSIS — O98.511 HERPES SIMPLEX VIRUS TYPE 2 (HSV-2) INFECTION AFFECTING PREGNANCY IN FIRST TRIMESTER (HHS-HCC): ICD-10-CM

## 2025-03-19 PROCEDURE — 0501F PRENATAL FLOW SHEET: CPT | Performed by: ADVANCED PRACTICE MIDWIFE

## 2025-03-19 NOTE — PROGRESS NOTES
Assessment/Plan   Diagnoses and all orders for this visit:  Family history of congenital heart defect  -     Fetal Echo Complete; Future  16 weeks gestation of pregnancy ([facility])  Abnormal genetic test during pregnancy  Herpes simplex virus type 2 (HSV-2) infection affecting pregnancy in first trimester ([facility])      Anatomy US reviewed incomplete r/t early gestational age, scheduled for full anatomy sono in 4 weeks   Fetal echo scheduled   Reviewed s/sx of PTL, warning signs, fetal movement counts, and when to call provider  Follow up in 4 weeks for a routine prenatal visit.    ULISES Garrett    Subjective     Morena Franco is a 30 y.o.  at 16w1d with a working estimated date of delivery of 2025, by Last Menstrual Period who presents for a routine prenatal visit. She denies vaginal bleeding, leakage of fluid, decreased fetal movements, or contractions.    Her pregnancy is complicated by:  Pregnancy Problems (from 25 to present)       Problem Noted Diagnosed Resolved    Abnormal genetic test during pregnancy 3/5/2025 by ULISES Garrett  No    Priority:  Medium       Overview Addendum 3/5/2025  9:14 AM by ULISES Garrett     3/5/2025: Abnormal carrier screen. 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 Partner testing recommended. Provide partner testing kit at next visit. SD            16 weeks gestation of pregnancy ([facility]) 2025 by ULISES Garrett  No    Priority:  Medium       Overview Addendum 2025  4:10 PM by ULISES Garrett     2025: Desired provider in labor: [] CNM  [] Physician   [x] Either Acceptable  [x] Blood Products: [x] Yes, accepts [] No, needs counseling  [x] Initial BMI: 30.88   [x] Prenatal Labs:   [x] Cervical Cancer Screening up to date: pt requests to complete postpartum   [x] Rh status: O pos  [x] Screen for IPV and Substance Use Risk:  [x] Genetic Screening (cfDNA):  Ordered: Low  risk Male   [x] First Trimester Anatomy Screen (11-13.6 wks): Scheduled   [x] Baby ASA (initiated):  [x] Pregnancy dated by: CRL    [] Anatomy US: (19-20 wks)  [] Federal Sterilization consent signed (if indicated):  [] 1hr GCT at 24-28wks:  [] Rhogam (if indicated):   [] Fetal Surveillance (if indicated):  [] Tdap (27-32 wks, may be given up to 36 wks if initial window missed):   [] RSV (32-36 wks) (Sept. to end ):     [x] Feeding Intentions: breast/bottle  [] Postpartum Birth control method:   [] GBS at 36 - 37 wks:  [] 39 weeks discussion of IOL vs. Expectant management:  [] Mode of delivery ( anticipated ):               Pregnancy with history of infertility, antepartum ([facility]) 2025 by ULISES Garrett  No    Priority:  Medium       Overview Signed 2025  9:05 AM by ULISES Garrett     2025: Pregnancy achieved via Clomid. SD           Family history of congenital heart defect 2025 by ULISES Garrett  No    Priority:  Medium       Overview Addendum 2025  9:37 AM by ULISES Garrett     2025: FOB brother born with transposition of the great arteries. Plan early anatomy and fetal echo. FOB born with kidney agenesis. SD           Herpes simplex virus type 2 (HSV-2) infection affecting pregnancy in first trimester ([facility]) 2025 by ULISES Garrett  No    Priority:  Medium       Overview Signed 2025  9:41 AM by ULISES Garrett     2025: Plan Valtrex at 36 weeks. 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 SD                    Objective   Physical Exam  Weight: 88 kg (194 lb)  TW.35 kg (14 lb)  Expected Total Weight Gain: 5 kg (11 lb)-9 kg (19 lb)    Pregravid BMI: 30.43  BP: 121/69         Postpartum Depression: Low Risk  (2025)    [place]  Depression Scale     Last EPDS Total Score: 0     Last EPDS Self Harm Result: Never       Prenatal Labs  Lab Results   Component Value Date    HGB 14.0 2025    HCT 41.0 2025     2025    ABO O 10/07/2024    LABRH POS 10/07/2024    NEISSGONOAMP Negative 10/07/2024    CHLAMTRACAMP Negative 10/07/2024    SYPHT Nonreactive 10/07/2024    HEPBSAG Nonreactive 10/07/2024    HIV1X2 Nonreactive 10/07/2024    URINECULTURE  2025     Growth indicates contamination with periurethral chuy. Repeat culture if clinically indicated.       Imaging  Scheduled for anatomy sono at 20 weeks  Plan fetal echo to be done at 24 weeks   MADDIE in 4 weeks     Mary De Jesus, APRN-CNM

## 2025-04-10 ENCOUNTER — HOSPITAL ENCOUNTER (OUTPATIENT)
Dept: RADIOLOGY | Facility: CLINIC | Age: 30
Discharge: HOME | End: 2025-04-10
Payer: COMMERCIAL

## 2025-04-10 DIAGNOSIS — Z36.82 ENCOUNTER FOR (NT) NUCHAL TRANSLUCENCY SCAN (HHS-HCC): ICD-10-CM

## 2025-04-10 PROBLEM — O28.3 ABNORMAL FETAL ULTRASOUND: Status: ACTIVE | Noted: 2025-04-10

## 2025-04-10 PROCEDURE — 76811 OB US DETAILED SNGL FETUS: CPT

## 2025-04-16 ENCOUNTER — ROUTINE PRENATAL (OUTPATIENT)
Dept: OBSTETRICS AND GYNECOLOGY | Facility: CLINIC | Age: 30
End: 2025-04-16
Payer: COMMERCIAL

## 2025-04-16 VITALS — WEIGHT: 200 LBS | SYSTOLIC BLOOD PRESSURE: 118 MMHG | DIASTOLIC BLOOD PRESSURE: 71 MMHG | BODY MASS INDEX: 33.8 KG/M2

## 2025-04-16 DIAGNOSIS — O28.3 ABNORMAL FETAL ULTRASOUND: ICD-10-CM

## 2025-04-16 DIAGNOSIS — F41.9: ICD-10-CM

## 2025-04-16 DIAGNOSIS — O98.511 HERPES SIMPLEX VIRUS TYPE 2 (HSV-2) INFECTION AFFECTING PREGNANCY IN FIRST TRIMESTER (HHS-HCC): ICD-10-CM

## 2025-04-16 DIAGNOSIS — O28.5 ABNORMAL GENETIC TEST DURING PREGNANCY: ICD-10-CM

## 2025-04-16 DIAGNOSIS — Z82.79 FAMILY HISTORY OF CONGENITAL HEART DEFECT: Primary | ICD-10-CM

## 2025-04-16 DIAGNOSIS — B00.9 HERPES SIMPLEX VIRUS TYPE 2 (HSV-2) INFECTION AFFECTING PREGNANCY IN FIRST TRIMESTER (HHS-HCC): ICD-10-CM

## 2025-04-16 PROBLEM — Z3A.20 20 WEEKS GESTATION OF PREGNANCY (HHS-HCC): Status: ACTIVE | Noted: 2025-01-29

## 2025-04-16 PROCEDURE — 0501F PRENATAL FLOW SHEET: CPT | Performed by: ADVANCED PRACTICE MIDWIFE

## 2025-04-16 NOTE — PROGRESS NOTES
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 Planning growth at 30 weeks. Fetal echo scheduled for 21 weeks     Reviewed s/sx of PTL, warning signs, fetal movement counts, and when to call provider  Follow up in 4 weeks for a routine prenatal visit.    ULISES Garrett    Subjective     Morena Franco is a 30 y.o.  at 20w1d with a working estimated date of delivery of 2025, by Last Menstrual Period who presents for a routine prenatal visit. She denies vaginal bleeding, leakage of fluid, decreased fetal movements, or contractions.    Her pregnancy is complicated by:  Pregnancy Problems (from 25 to present)       Problem Noted Diagnosed Resolved    Abnormal fetal ultrasound 4/10/2025 by ULISES Garrett  No    Priority:  Medium       Overview Signed 4/10/2025  9:54 AM by ULISES Garrett   4/10/2025: Isolated decreased BPD/FL ratio is noted. This appears to be an isolated finding. Growth at 30 weeks SD           Abnormal genetic test during pregnancy 3/5/2025 by ULISES Garrett  No    Priority:  Medium       Overview Addendum 3/5/2025  9:14 AM by ULISES Garrett   3/5/2025: Abnormal carrier screen. 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 Partner testing recommended. Provide partner testing kit at next visit. SD            20 weeks gestation of pregnancy ([facility]) 2025 by ULISES Garrett  No    Priority:  Medium       Overview Addendum 2025  4:10 PM by ULISES Garrett   2025: Desired provider in labor: [] CNM  [] Physician   [x] Either Acceptable  [x] Blood Products: [x]  Yes, accepts [] No, needs counseling  [x] Initial BMI: 30.88   [x] Prenatal Labs:   [x] Cervical Cancer Screening up to date: pt requests to complete postpartum   [x] Rh status: O pos  [x] Screen for IPV and Substance Use Risk:  [x] Genetic Screening (cfDNA):  Ordered: Low risk Male   [x] First Trimester Anatomy Screen (11-13.6 wks): Scheduled   [x] Baby ASA (initiated):  [x] Pregnancy dated by: CRL    [] Anatomy US: (19-20 wks)  [] Federal Sterilization consent signed (if indicated):  [] 1hr GCT at 24-28wks:  [] Rhogam (if indicated):   [] Fetal Surveillance (if indicated):  [] Tdap (27-32 wks, may be given up to 36 wks if initial window missed):   [] RSV (32-36 wks) (Sept. to end of Jan):     [x] Feeding Intentions: breast/bottle  [] Postpartum Birth control method:   [] GBS at 36 - 37 wks:  [] 39 weeks discussion of IOL vs. Expectant management:  [] Mode of delivery ( anticipated ):               Pregnancy with history of infertility, antepartum ([facility]) 1/29/2025 by ULISES Garrett  No    Priority:  Medium       Overview Signed 1/29/2025  9:05 AM by ULISES Garrett   1/29/2025: Pregnancy achieved via Clomid. SD           Family history of congenital heart defect 1/29/2025 by ULISES Garrett  No    Priority:  Medium       Overview Addendum 1/29/2025  9:37 AM by ULISES Garrett   1/29/2025: FOB brother born with transposition of the great arteries. Plan early anatomy and fetal echo. FOB born with kidney agenesis. SD           Herpes simplex virus type 2 (HSV-2) infection affecting pregnancy in first trimester ([facility]) 1/29/2025 by ULISES Garrett  No    Priority:  Medium       Overview Signed 1/29/2025  9:41 AM by ULISES Garrett   1/29/2025: Plan Valtrex at 36 weeks. 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 SD                    Objective   Physical Exam     TW.35 kg (14 lb)  Expected Total Weight Gain: 5 kg (11 lb)-9 kg (19 lb)   Pregravid BMI: 30.43            Postpartum Depression: Low Risk  (2025)    [place]  Depression Scale     Last EPDS Total Score: 0     Last EPDS Self Harm Result: Never       Prenatal Labs  Lab Results   Component Value Date    HGB 14.0 2025    HCT 41.0 2025     2025    ABO O 10/07/2024    LABRH POS 10/07/2024    NEISSGONOAMP Negative 10/07/2024    CHLAMTRACAMP Negative 10/07/2024    SYPHT Nonreactive 10/07/2024    HEPBSAG Nonreactive 10/07/2024    HIV1X2 Nonreactive 10/07/2024    URINECULTURE  2025     Growth indicates contamination with periurethral chuy. Repeat culture if clinically indicated.       Imaging  Reviewed anatomy results.  Growth at 30 weeks   Mary De Jesus, LANIE-CNM

## 2025-04-17 ENCOUNTER — APPOINTMENT (OUTPATIENT)
Dept: PEDIATRIC CARDIOLOGY | Facility: CLINIC | Age: 30
End: 2025-04-17
Payer: COMMERCIAL

## 2025-04-17 VITALS
SYSTOLIC BLOOD PRESSURE: 118 MMHG | BODY MASS INDEX: 33.35 KG/M2 | DIASTOLIC BLOOD PRESSURE: 73 MMHG | WEIGHT: 200.18 LBS | HEART RATE: 98 BPM | HEIGHT: 65 IN

## 2025-04-17 DIAGNOSIS — O35.BXX0 ABNORMAL FETAL ECHOCARDIOGRAPHY AFFECTING ANTEPARTUM CARE OF MOTHER, SINGLE OR UNSPECIFIED FETUS (HHS-HCC): Primary | ICD-10-CM

## 2025-04-17 DIAGNOSIS — O28.3 ABNORMAL FETAL ULTRASOUND: ICD-10-CM

## 2025-04-17 DIAGNOSIS — O35.8XX0 MATERNAL CARE FOR OTHER (SUSPECTED) FETAL ABNORMALITY AND DAMAGE, NOT APPLICABLE OR UNSPECIFIED (HHS-HCC): ICD-10-CM

## 2025-04-17 PROCEDURE — 76825 ECHO EXAM OF FETAL HEART: CPT | Performed by: PEDIATRICS

## 2025-04-17 PROCEDURE — 3008F BODY MASS INDEX DOCD: CPT | Performed by: PEDIATRICS

## 2025-04-17 PROCEDURE — 99204 OFFICE O/P NEW MOD 45 MIN: CPT | Performed by: PEDIATRICS

## 2025-04-17 PROCEDURE — 93325 DOPPLER ECHO COLOR FLOW MAPG: CPT | Performed by: PEDIATRICS

## 2025-04-17 PROCEDURE — 76827 ECHO EXAM OF FETAL HEART: CPT | Performed by: PEDIATRICS

## 2025-04-17 NOTE — PROGRESS NOTES
Morena Franco was seen at the request of Grace Ambrose for a chief complaint of family history of transposition of the great arteries; a report with my findings is being sent via written or electronic means the referring physician with my recommendations for treatment.     I had the pleasure of seeing Morena Franco in Pediatric Cardiology consultation at our [facility] location as part of our prenatal heart program for family history of transposition of the great arteries.  She is a 30 y.o. year-old  woman, currently 20w2d weeks gestation. Patient's last menstrual period was 2024 (exact date). Estimated Date of Delivery: 25.  There have been no pregnancy complications.   She has not been hospitalized during this pregnancy.  She had a NIPT, which was normal.  She had a second trimester ultrasound which was normal with the exception of decreased BPD/FL (O/E) ratio.      Prior to the visit, I personally reviewed the cardiac portions of the obstetrical ultrasound performed on 4/10/25.  There is normal segmental anatomy with normal 4 chamber, outflow tract and 3 vessel views.  There is no evidence of septation defect, right or left ventricular outflow obstruction or significant valvular regurgitation.    Her previous obstetrical history is without complication.  Her past medical history is significant for anxiety.  She has no history of congenital heart disease, arrhythmia, cardiomyopathy, hypercholesterolemia, hypertension, diabetes, rheumatic heart disease, cancer, asthma, lupus, Sjogren syndrome, clotting disorder, depression, alcohol abuse, phenylketonuria, or DiGeorge.  She has had no surgeries.  She takes duloxetine and Aspirin.  She is allergic to anesthetics - colin type- parabens, amoxicillin-pot clavulanate, and sulfamethoxazole-trimethoprim. She is currently taking prenatal vitamins.      Her family history is negative for congenital heart disease, early atherosclerosis, sudden cardiac  "death, long QT syndrome, cardiomyopathy, aortic aneurysm, or genetic or metabolic disease.  Per patient report, FONIYAH was born with a small, nonfunctional kidney and one normal kidney. His brother was born with TGA that was repaired as an infant and is doing well.    She currently lives with her spouse and is .  She works as a healthcare director.  She does not smoke.  She denies illicit drug use or alcohol abuse.  She denies verbal, sexual, or physical abuse.     Delivery Hospital: [facility]  Father of the baby's name: same    /73 (BP Location: Right arm, Patient Position: Sitting)   Pulse 98   Ht 1.652 m (5' 5.04\")   Wt 90.8 kg (200 lb 2.8 oz)   LMP 11/26/2024 (Exact Date)   BMI 33.27 kg/m²     She was resting comfortably in the examination room and alert, active and in no respiratory distress. Skin was without rash.  HEENT: moist mucous membranes, no JVD, goiter. Breathing is not labored.  She was acyanotic.  There was no peripheral edema.   The abdomen was gravid, soft, nontender with normal bowel sounds.  The liver was not palpable.  The spleen tip was not palpable.  She had a normal gait and normal strength in all extremities.  Cranial nerves II - XII are intact.  She had no clubbing, cyanosis, or edema.    A two-dimensional and Doppler fetal echocardiogram was performed today and interpreted by me at 20w2d weeks gestation.  The fetal echocardiogram showed normal segmental anatomy with no structural abnormalities found.  There is normal cardiac function.  There is no evidence of septation defect, right or left ventricular outflow obstruction or significant valvular regurgitation.  The fetal heart rate was within normal limits without ectopy or arrhythmia seen.  The spectral Doppler pattern across all valves, venous structures, and arterial structures was within normal limits.  There is no pericardial effusion.  Please see full report for details.    In summary, Morena Franco is a 30 y.o. year-old "  woman, currently 20w2d weeks gestation, who had a normal fetal echocardiogram at today's visit.  Therefore, we did not make any changes to her current delivery plan.  We did not prescribe any medications.  We did not recommend intervention.  She does not necessarily need to follow up with pediatric cardiology after the baby is born unless the pediatric team has any concerns or worries.     LOC: 0  Normal fetal echocardiogram within the limitations of ultrasound. No changes were made to current delivery plan. Triage code 0:  Delivery per OB at patient's preferred hospital.  Standard  care per  team.  Cardiology consult not necessary, unless there are clinical concerns.       Scribe’s statement: I, Samantha Candelaria CNP, am scribing for, and in the presence of, Dr. Sandoval.    Thank you for allowing me to participate in Morena's care.  If you have any further questions, please do not hesitate to contact me.     Hunter Sandoval M.D.  [facility], Director  Ambulatory Pediatric Cardiology   Division of Pediatric Cardiology  [facility]  [facility]   of Pediatrics, [facility] [employer]  [facility] [address] [place] [address]  [place] [id number]  Office:  [phone]  Fax:       [phone]  e-mail:  [email or web address]    I spent greater than 45 minutes in performance of this consultation, of which greater than 50% was related to coordination of care or counseling.

## 2025-04-17 NOTE — LETTER
2025     Grace Ambrose MD  [address]  [facility] [address]    Patient: Morena Frnaco   YOB: 1995   Date of Visit: 2025       Dear Dr. Grace Ambrose MD:    Thank you for referring Morena Franco to me for evaluation. Below are my notes for this consultation.  If you have questions, please do not hesitate to call me. I look forward to following your patient along with you.       Sincerely,     Hunter Sandoval MD      CC: Mary De Jesus, APRN-CN  Farida Fernández, APRN-[facility], [facility]  Samantha Candelaria, APRN-CNP  ______________________________________________________________________________________    Morena Franco was seen at the request of Grace Ambrose for a chief complaint of family history of transposition of the great arteries; a report with my findings is being sent via written or electronic means the referring physician with my recommendations for treatment.     I had the pleasure of seeing Morena Franco in Pediatric Cardiology consultation at our [facility] location as part of our prenatal heart program for family history of transposition of the great arteries.  She is a 30 y.o. year-old  woman, currently 20w2d weeks gestation. Patient's last menstrual period was 2024 (exact date). Estimated Date of Delivery: 25.  There have been no pregnancy complications.   She has not been hospitalized during this pregnancy.  She had a NIPT, which was normal.  She had a second trimester ultrasound which was normal with the exception of decreased BPD/FL (O/E) ratio.      Prior to the visit, I personally reviewed the cardiac portions of the obstetrical ultrasound performed on 4/10/25.  There is normal segmental anatomy with normal 4 chamber, outflow tract and 3 vessel views.  There is no evidence of septation defect, right or left ventricular outflow obstruction or significant valvular regurgitation.    Her previous obstetrical history is without complication.  Her past  "medical history is significant for anxiety.  She has no history of congenital heart disease, arrhythmia, cardiomyopathy, hypercholesterolemia, hypertension, diabetes, rheumatic heart disease, cancer, asthma, lupus, Sjogren syndrome, clotting disorder, depression, alcohol abuse, phenylketonuria, or DiGeorge.  She has had no surgeries.  She takes duloxetine and Aspirin.  She is allergic to anesthetics - colin type- parabens, amoxicillin-pot clavulanate, and sulfamethoxazole-trimethoprim. She is currently taking prenatal vitamins.      Her family history is negative for congenital heart disease, early atherosclerosis, sudden cardiac death, long QT syndrome, cardiomyopathy, aortic aneurysm, or genetic or metabolic disease.  Per patient report, FONIYAH was born with a small, nonfunctional kidney and one normal kidney. His brother was born with TGA that was repaired as an infant and is doing well.    She currently lives with her spouse and is .  She works as a healthcare director.  She does not smoke.  She denies illicit drug use or alcohol abuse.  She denies verbal, sexual, or physical abuse.     Delivery Hospital: [facility]  Father of the baby's name: same    /73 (BP Location: Right arm, Patient Position: Sitting)   Pulse 98   Ht 1.652 m (5' 5.04\")   Wt 90.8 kg (200 lb 2.8 oz)   LMP 11/26/2024 (Exact Date)   BMI 33.27 kg/m²     She was resting comfortably in the examination room and alert, active and in no respiratory distress. Skin was without rash.  HEENT: moist mucous membranes, no JVD, goiter. Breathing is not labored.  She was acyanotic.  There was no peripheral edema.   The abdomen was gravid, soft, nontender with normal bowel sounds.  The liver was not palpable.  The spleen tip was not palpable.  She had a normal gait and normal strength in all extremities.  Cranial nerves II - XII are intact.  She had no clubbing, cyanosis, or edema.    A two-dimensional and Doppler fetal echocardiogram was performed " today and interpreted by me at 20w2d weeks gestation.  The fetal echocardiogram showed normal segmental anatomy with no structural abnormalities found.  There is normal cardiac function.  There is no evidence of septation defect, right or left ventricular outflow obstruction or significant valvular regurgitation.  The fetal heart rate was within normal limits without ectopy or arrhythmia seen.  The spectral Doppler pattern across all valves, venous structures, and arterial structures was within normal limits.  There is no pericardial effusion.  Please see full report for details.    In summary, Morena Franco is a 30 y.o. year-old  woman, currently 20w2d weeks gestation, who had a normal fetal echocardiogram at today's visit.  Therefore, we did not make any changes to her current delivery plan.  We did not prescribe any medications.  We did not recommend intervention.  She does not necessarily need to follow up with pediatric cardiology after the baby is born unless the pediatric team has any concerns or worries.     LOC: 0  Normal fetal echocardiogram within the limitations of ultrasound. No changes were made to current delivery plan. Triage code 0:  Delivery per OB at patient's preferred hospital.  Standard  care per  team.  Cardiology consult not necessary, unless there are clinical concerns.       Scribe’s statement: I, Samantha Candelaria CNP, am scribing for, and in the presence of, Dr. Sandoval.    Thank you for allowing me to participate in [place] care.  If you have any further questions, please do not hesitate to contact me.     Hunter Sandoval M.D.  Fetal Heart Center, Director  Ambulatory Pediatric Cardiology   Division of Pediatric Cardiology  [facility]  [facility]   of Pediatrics, [employer] [facility] [employer]  [facility] -   [address]  [place] [address]  Office:  [phone]  Fax:       [phone]  e-mail:  [email or web address]    I spent greater than 45 minutes in performance of this consultation, of which greater than 50% was related to coordination of care or counseling.

## 2025-04-18 ENCOUNTER — APPOINTMENT (OUTPATIENT)
Dept: RADIOLOGY | Facility: CLINIC | Age: 30
End: 2025-04-18
Payer: COMMERCIAL

## 2025-05-14 ENCOUNTER — ROUTINE PRENATAL (OUTPATIENT)
Dept: OBSTETRICS AND GYNECOLOGY | Facility: CLINIC | Age: 30
End: 2025-05-14
Payer: COMMERCIAL

## 2025-05-14 VITALS — SYSTOLIC BLOOD PRESSURE: 121 MMHG | DIASTOLIC BLOOD PRESSURE: 69 MMHG | WEIGHT: 209 LBS | BODY MASS INDEX: 34.74 KG/M2

## 2025-05-14 DIAGNOSIS — Z82.79 FAMILY HISTORY OF CONGENITAL HEART DEFECT: Primary | ICD-10-CM

## 2025-05-14 DIAGNOSIS — O28.5 ABNORMAL GENETIC TEST DURING PREGNANCY: ICD-10-CM

## 2025-05-14 DIAGNOSIS — Z3A.24 24 WEEKS GESTATION OF PREGNANCY (HHS-HCC): ICD-10-CM

## 2025-05-14 DIAGNOSIS — O28.3 ABNORMAL FETAL ULTRASOUND: ICD-10-CM

## 2025-05-14 PROCEDURE — 0501F PRENATAL FLOW SHEET: CPT | Performed by: ADVANCED PRACTICE MIDWIFE

## 2025-05-14 NOTE — PROGRESS NOTES
Assessment/Plan   Diagnoses and all orders for this visit:  Family history of congenital heart defect  Abnormal genetic test during pregnancy  24 weeks gestation of pregnancy ([facility])  -     CBC Anemia Panel With Reflex,Pregnancy; Future  -     Glucose, 1 Hour Screen, Pregnancy; Future  -     Syphilis Screen with Reflex; Future      Discussed anatomy scan and need for follow up growth  Discussed normal fetal echo  Pt aware of need of rthird trimester testing: Ordered  Reviewed s/sx of PTL, warning signs, fetal movement counts, and when to call provider  Follow up in 4 week for a routine prenatal visit.    ULISES Garrett    Subjective     Morena Franco is a 30 y.o.  at 24w1d with a working estimated date of delivery of 2025, by Last Menstrual Period who presents for a routine prenatal visit. She denies vaginal bleeding, leakage of fluid, decreased fetal movements, or contractions.    Her pregnancy is complicated by:  Pregnancy Problems (from 25 to present)       Problem Noted Diagnosed Resolved    Abnormal fetal ultrasound 4/10/2025 by ULISES Garrett  No    Priority:  Medium       Overview Signed 4/10/2025  9:54 AM by ULISES Garrett   4/10/2025: Isolated decreased BPD/FL ratio is noted. This appears to be an isolated finding. Growth at 30 weeks SD           Abnormal genetic test during pregnancy 3/5/2025 by ULISES Garrett  No    Priority:  Medium       Overview Addendum 2025  7:40 AM by ULISES Garrett   2025: Partner testing negative. SD  3/5/2025: Abnormal carrier screen. 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 Partner testing recommended. Provide partner testing kit at next visit. SD            20 weeks gestation of pregnancy ([facility]) 2025 by ULISES Garrett  No    Priority:  Medium       Overview Addendum 2025  4:10 PM by ULISES Garrett   2025: Desired provider in  labor: [] CNM  [] Physician   [x] Either Acceptable  [x] Blood Products: [x] Yes, accepts [] No, needs counseling  [x] Initial BMI: 30.88   [x] Prenatal Labs:   [x] Cervical Cancer Screening up to date: pt requests to complete postpartum   [x] Rh status: O pos  [x] Screen for IPV and Substance Use Risk:  [x] Genetic Screening (cfDNA):  Ordered: Low risk Male   [x] First Trimester Anatomy Screen (11-13.6 wks): Scheduled   [x] Baby ASA (initiated):  [x] Pregnancy dated by: CRL    [] Anatomy US: (19-20 wks)  [] Federal Sterilization consent signed (if indicated):  [] 1hr GCT at 24-28wks:  [] Rhogam (if indicated):   [] Fetal Surveillance (if indicated):  [] Tdap (27-32 wks, may be given up to 36 wks if initial window missed):   [] RSV (32-36 wks) (Sept. to end of Jan):     [x] Feeding Intentions: breast/bottle  [] Postpartum Birth control method:   [] GBS at 36 - 37 wks:  [] 39 weeks discussion of IOL vs. Expectant management:  [] Mode of delivery ( anticipated ):               Pregnancy with history of infertility, antepartum ([facility]) 1/29/2025 by ULISES Garrett  No    Priority:  Medium       Overview Signed 1/29/2025  9:05 AM by ULISES Garrett   1/29/2025: Pregnancy achieved via Clomid. SD           Family history of congenital heart defect 1/29/2025 by ULISES Garrett  No    Priority:  Medium       Overview Addendum 1/29/2025  9:37 AM by ULISES Garrett   1/29/2025: FOB brother born with transposition of the great arteries. Plan early anatomy and fetal echo. FOB born with kidney agenesis. SD           Herpes simplex virus type 2 (HSV-2) infection affecting pregnancy in first trimester ([facility]) 1/29/2025 by ULISES Garrett  No    Priority:  Medium       Overview Signed 1/29/2025  9:41 AM by ULISES Garrett   1/29/2025: Plan Valtrex at 36 weeks. 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 SD                    Objective   Physical Exam:      TW.072 kg (20 lb)  Expected Total Weight Gain: 7 kg (15 lb)-11.5 kg (25 lb)   Pregravid BMI: 29.92                     Postpartum Depression: Low Risk  (2025)    [place]  Depression Scale     Last EPDS Total Score: 0     Last EPDS Self Harm Result: Never        Prenatal Labs  Lab Results   Component Value Date    HGB 14.0 2025    HCT 41.0 2025     2025    ABO O 10/07/2024    LABRH POS 10/07/2024    NEISSGONOAMP Negative 10/07/2024    CHLAMTRACAMP Negative 10/07/2024    SYPHT Nonreactive 10/07/2024    HEPBSAG Nonreactive 10/07/2024    HIV1X2 Nonreactive 10/07/2024    URINECULTURE  2025     Growth indicates contamination with periurethral chuy. Repeat culture if clinically indicated.     No results found for: \"GLUC1P\"  No results found for: \"GRPBSTREP\"     ULISES Garrett    "

## 2025-05-21 ENCOUNTER — TELEPHONE (OUTPATIENT)
Dept: OBSTETRICS AND GYNECOLOGY | Facility: CLINIC | Age: 30
End: 2025-05-21
Payer: COMMERCIAL

## 2025-05-21 NOTE — TELEPHONE ENCOUNTER
Per Dr. Acosta, patient should follow up with her PCP for evaluation of possibly a disc issue. Patient is agreeable to this. Denies any further questions at this time.

## 2025-05-21 NOTE — TELEPHONE ENCOUNTER
Call received from patient. She is complaining of numbness, burning and aching in her arms for a couple weeks now. She is currently 25w1d pregnant. She states that she has a history of carpal tunnel, but this is her whole arm. She states she has tried the wrist splints and wrapping, ice, stretching and it is continuing to get worse. She states that it seems to be worse at night when she lays down and the only thing she can do to make it somewhat better is sitting straight up. She is not sleeping and it is starting to affect her daily life as she is having a hard time typing at work when her fingers feel numb. She reports that she has tried to remove sodium from her diet as much as possible because she is also noticing ankle swelling. Advised patient that carpal tunnel can flare up while pregnant. Advised that since the symptoms are continuing to worsen despite interventions, this RN will send a message to her provider for review and recommendations. Patient is agreeable to this.

## 2025-06-11 ENCOUNTER — ROUTINE PRENATAL (OUTPATIENT)
Dept: OBSTETRICS AND GYNECOLOGY | Facility: CLINIC | Age: 30
End: 2025-06-11
Payer: COMMERCIAL

## 2025-06-11 VITALS — SYSTOLIC BLOOD PRESSURE: 111 MMHG | WEIGHT: 213 LBS | DIASTOLIC BLOOD PRESSURE: 54 MMHG | BODY MASS INDEX: 35.4 KG/M2

## 2025-06-11 DIAGNOSIS — G56.03 BILATERAL CARPAL TUNNEL SYNDROME: ICD-10-CM

## 2025-06-11 DIAGNOSIS — O98.511 HERPES SIMPLEX VIRUS TYPE 2 (HSV-2) INFECTION AFFECTING PREGNANCY IN FIRST TRIMESTER (HHS-HCC): ICD-10-CM

## 2025-06-11 DIAGNOSIS — F41.9: ICD-10-CM

## 2025-06-11 DIAGNOSIS — O09.00 PREGNANCY WITH HISTORY OF INFERTILITY, ANTEPARTUM (HHS-HCC): ICD-10-CM

## 2025-06-11 DIAGNOSIS — O28.5 ABNORMAL GENETIC TEST DURING PREGNANCY: ICD-10-CM

## 2025-06-11 DIAGNOSIS — B00.9 HERPES SIMPLEX VIRUS TYPE 2 (HSV-2) INFECTION AFFECTING PREGNANCY IN FIRST TRIMESTER (HHS-HCC): ICD-10-CM

## 2025-06-11 DIAGNOSIS — O28.3 ABNORMAL FETAL ULTRASOUND: Primary | ICD-10-CM

## 2025-06-11 DIAGNOSIS — Z3A.28 28 WEEKS GESTATION OF PREGNANCY (HHS-HCC): ICD-10-CM

## 2025-06-11 PROCEDURE — 0501F PRENATAL FLOW SHEET: CPT | Performed by: ADVANCED PRACTICE MIDWIFE

## 2025-06-11 NOTE — PROGRESS NOTES
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 Reports has been unable to feel her fingertips for over a week. She is wearing braces at night and continues chiropractic care so far without relief. Plan ortho referral.     Reviewed lab results planning to track blood sugars for two weeks as an alternative to 1 hr gct   Reviewed growth US scheduled fo   Reviewed s/sx of PTL, warning signs, fetal movement counts, and when to call provider  Follow up in 2 week for a routine prenatal visit.    ULISES Garrett    Subjective     Morena Franco is a 30 y.o.  at 28w1d with a working estimated date of delivery of 2025, by Last Menstrual Period who presents for a routine prenatal visit. She denies vaginal bleeding, leakage of fluid, decreased fetal movements, or contractions.    Her pregnancy is complicated by:  Pregnancy Problems (from 25 to present)       Problem Noted Diagnosed Resolved    Abnormal fetal ultrasound 4/10/2025 by ULISES Garrett  No    Priority:  Medium       Overview Signed 4/10/2025  9:54 AM by ULISES Garrett   4/10/2025: Isolated decreased BPD/FL ratio is noted. This appears to be an isolated finding. Growth at 30 weeks SD           Abnormal genetic test during pregnancy 3/5/2025 by ULISES Garrett  No    Priority:  Medium       Overview Addendum 2025  7:40 AM by ULISES Garrett   2025: Partner testing negative. SD  3/5/2025: Abnormal carrier screen. 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 Partner testing recommended.  Provide partner testing kit at next visit. SD            28 weeks gestation of pregnancy ([facility]) 1/29/2025 by ULISES Garrett  No    Priority:  Medium       Overview Addendum 2/25/2025  4:10 PM by ULISES Garrett   1/29/2025: Desired provider in labor: [] CNM  [] Physician   [x] Either Acceptable  [x] Blood Products: [x] Yes, accepts [] No, needs counseling  [x] Initial BMI: 30.88   [x] Prenatal Labs:   [x] Cervical Cancer Screening up to date: pt requests to complete postpartum   [x] Rh status: O pos  [x] Screen for IPV and Substance Use Risk:  [x] Genetic Screening (cfDNA):  Ordered: Low risk Male   [x] First Trimester Anatomy Screen (11-13.6 wks): Scheduled   [x] Baby ASA (initiated):  [x] Pregnancy dated by: CRL    [] Anatomy US: (19-20 wks)  [] Federal Sterilization consent signed (if indicated):  [] 1hr GCT at 24-28wks:  [] Rhogam (if indicated):   [] Fetal Surveillance (if indicated):  [] Tdap (27-32 wks, may be given up to 36 wks if initial window missed):   [] RSV (32-36 wks) (Sept. to end of Jan):     [x] Feeding Intentions: breast/bottle  [] Postpartum Birth control method:   [] GBS at 36 - 37 wks:  [] 39 weeks discussion of IOL vs. Expectant management:  [] Mode of delivery ( anticipated ):               Pregnancy with history of infertility, antepartum ([facility]) 1/29/2025 by ULISES Garrett  No    Priority:  Medium       Overview Signed 1/29/2025  9:05 AM by ULISES Garrett   1/29/2025: Pregnancy achieved via Clomid. SD           Family history of congenital heart defect 1/29/2025 by ULISES Garrett  No    Priority:  Medium       Overview Addendum 5/14/2025  8:51 AM by ULISES Garrett   5/14/2025: Normal fetal echo SD  1/29/2025: FOB brother born with transposition of the great arteries. Plan early anatomy and fetal echo. FOB born with kidney agenesis. SD           Herpes simplex virus type 2 (HSV-2) infection affecting pregnancy in  "first trimester ([facility]) 2025 by ULISES Garrett  No    Priority:  Medium       Overview Signed 2025  9:41 AM by ULISES Garrett   2025: Plan Valtrex at 36 weeks. 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 SD                    Objective   Physical Exam:   Weight: 96.6 kg (213 lb)  TWG: 15 kg (33 lb)  Expected Total Weight Gain: 7 kg (15 lb)-11.5 kg (25 lb)   Pregravid BMI: 29.92                     Postpartum Depression: Low Risk  (2025)    [place]  Depression Scale     Last EPDS Total Score: 0     Last EPDS Self Harm Result: Never        Prenatal Labs  Lab Results   Component Value Date    HGB 14.0 2025    HCT 41.0 2025     2025    ABO O 10/07/2024    LABRH POS 10/07/2024    NEISSGONOAMP Negative 10/07/2024    CHLAMTRACAMP Negative 10/07/2024    SYPHT Nonreactive 10/07/2024    HEPBSAG Nonreactive 10/07/2024    HIV1X2 Nonreactive 10/07/2024    URINECULTURE  2025     Growth indicates contamination with periurethral chuy. Repeat culture if clinically indicated.     No results found for: \"GLUC1P\"  No results found for: \"GRPBSTREP\"     Imaging  Growth sono on   MADDIE in 1 week     ULISES Garrett    "

## 2025-06-13 ENCOUNTER — OFFICE VISIT (OUTPATIENT)
Dept: ORTHOPEDIC SURGERY | Facility: HOSPITAL | Age: 30
End: 2025-06-13
Payer: COMMERCIAL

## 2025-06-13 ENCOUNTER — LAB (OUTPATIENT)
Dept: LAB | Facility: HOSPITAL | Age: 30
End: 2025-06-13
Payer: COMMERCIAL

## 2025-06-13 DIAGNOSIS — Z3A.24 24 WEEKS GESTATION OF PREGNANCY (HHS-HCC): Primary | ICD-10-CM

## 2025-06-13 DIAGNOSIS — G56.03 BILATERAL CARPAL TUNNEL SYNDROME: ICD-10-CM

## 2025-06-13 LAB
ERYTHROCYTE [DISTWIDTH] IN BLOOD BY AUTOMATED COUNT: 13.2 % (ref 11.5–14.5)
FERRITIN SERPL-MCNC: 12 NG/ML
FOLATE SERPL-MCNC: >24 NG/ML
HCT VFR BLD AUTO: 32.6 % (ref 36–46)
HGB BLD-MCNC: 10.7 G/DL (ref 12–16)
IRON SATN MFR SERPL: 25 %
IRON SERPL-MCNC: 126 UG/DL
MCH RBC QN AUTO: 30.1 PG (ref 26–34)
MCHC RBC AUTO-ENTMCNC: 32.8 G/DL (ref 32–36)
MCV RBC AUTO: 92 FL (ref 80–100)
NRBC BLD-RTO: 0 /100 WBCS (ref 0–0)
PLATELET # BLD AUTO: 214 X10*3/UL (ref 150–450)
RBC # BLD AUTO: 3.56 X10*6/UL (ref 4–5.2)
REFLEX ADDED, ANEMIA PANEL: NORMAL
TIBC SERPL-MCNC: 514 UG/DL
UIBC SERPL-MCNC: 388 UG/DL
VIT B12 SERPL-MCNC: 304 PG/ML
WBC # BLD AUTO: 8.4 X10*3/UL (ref 4.4–11.3)

## 2025-06-13 PROCEDURE — 1036F TOBACCO NON-USER: CPT | Performed by: ORTHOPAEDIC SURGERY

## 2025-06-13 PROCEDURE — 20526 THER INJECTION CARP TUNNEL: CPT | Mod: 50 | Performed by: ORTHOPAEDIC SURGERY

## 2025-06-13 PROCEDURE — 99203 OFFICE O/P NEW LOW 30 MIN: CPT | Performed by: ORTHOPAEDIC SURGERY

## 2025-06-13 PROCEDURE — 2500000004 HC RX 250 GENERAL PHARMACY W/ HCPCS (ALT 636 FOR OP/ED): Performed by: ORTHOPAEDIC SURGERY

## 2025-06-13 PROCEDURE — 82607 VITAMIN B-12: CPT

## 2025-06-13 PROCEDURE — 83550 IRON BINDING TEST: CPT

## 2025-06-13 PROCEDURE — 36415 COLL VENOUS BLD VENIPUNCTURE: CPT

## 2025-06-13 PROCEDURE — 99202 OFFICE O/P NEW SF 15 MIN: CPT | Mod: 25 | Performed by: ORTHOPAEDIC SURGERY

## 2025-06-13 PROCEDURE — 85027 COMPLETE CBC AUTOMATED: CPT

## 2025-06-13 PROCEDURE — 82746 ASSAY OF FOLIC ACID SERUM: CPT

## 2025-06-13 PROCEDURE — 82728 ASSAY OF FERRITIN: CPT

## 2025-06-13 RX ADMIN — TRIAMCINOLONE ACETONIDE 30 MG: 40 INJECTION, SUSPENSION INTRA-ARTICULAR; INTRAMUSCULAR at 17:32

## 2025-06-13 RX ADMIN — LIDOCAINE HYDROCHLORIDE 0.75 ML: 10 INJECTION, SOLUTION INFILTRATION; PERINEURAL at 17:32

## 2025-06-13 ASSESSMENT — PAIN SCALES - GENERAL: PAINLEVEL_OUTOF10: 6

## 2025-06-13 ASSESSMENT — PAIN - FUNCTIONAL ASSESSMENT: PAIN_FUNCTIONAL_ASSESSMENT: 0-10

## 2025-06-13 NOTE — PROGRESS NOTES
CHIEF COMPLAINT         CTS    ASSESSMENT + PLAN     ***        HISTORY OF PRESENT ILLNESS       Patient is a 30 y.o. ***-hand dominant female ***, who presents today for evaluation of ***      REVIEW OF SYSTEMS       A 30-item multi-system Review Of Systems was obtained on today's intake form.  This was reviewed with the patient and is correct.  The pertinent positives and negatives are listed above.  The form has been scanned separately into the medical record.      PHYSICAL EXAM    Constitutional:    Appears stated age. Well-developed and well-nourished ***.  Psychiatric:         Pleasant normal mood and affect. Behavior is appropriate for the situation.   Head:                   Normocephalic and atraumatic.  Eyes:                    Pupils are equal and round.  Cardiovascular:  2+ radial and ulnar pulses. Fingers well-perfused.  Respiratory:        Effort normal. No respiratory distress. Speaking in complete sentences.  Neurologic:       Alert and oriented to person, place, and time.  Skin:                Skin is intact, warm and dry.  Hematologic / Lymphatic:    No lymphedema or lymphangitis.    Extremities / Musculoskeletal:                      ***      IMAGING / LABS / EMGs           ***      Medical History[1]    Medication Documentation Review Audit       Reviewed by Irma Butler MA (Medical Assistant) on 06/11/25 at 0928      Medication Order Taking? Sig Documenting Provider Last Dose Status   aspirin 81 mg chewable tablet [id number]  Chew 1 tablet (81 mg) once daily. Mary De Jesus, APRN-CNM  Active   DULoxetine (Cymbalta) 60 mg DR capsule [id number] No Take 1 capsule (60 mg) by mouth once daily. Historical Provider, MD Taking Active   ondansetron (Zofran) 8 mg tablet [id number]  Take 1 tablet (8 mg) by mouth every 8 hours if needed for nausea or vomiting for up to 15 doses. Brendan Batista MD  Active                    RX Allergies[2]    Social History     Socioeconomic History    Marital status:       Spouse name: Not on file    Number of children: Not on file    Years of education: Not on file    Highest education level: Not on file   Occupational History    Not on file   Tobacco Use    Smoking status: Never    Smokeless tobacco: Never   Substance and Sexual Activity    Alcohol use: Not Currently    Drug use: Never    Sexual activity: Yes     Partners: Male     Birth control/protection: Coitus interruptus, None   Other Topics Concern    Not on file   Social History Narrative    Not on file     Social Drivers of Health     Financial Resource Strain: Low Risk  (5/8/2025)    Received from [facility]    Overall Financial Resource Strain (CARDIA)     Difficulty of Paying Living Expenses: Not very hard   Food Insecurity: No Food Insecurity (5/8/2025)    Received from [facility]    Hunger Vital Sign     Worried About Running Out of Food in the Last Year: Never true     Ran Out of Food in the Last Year: Never true   Transportation Needs: No Transportation Needs (5/8/2025)    Received from [facility]    PRAPARE - Transportation     Lack of Transportation (Medical): No     Lack of Transportation (Non-Medical): No   Physical Activity: Insufficiently Active (5/8/2025)    Received from [facility]    Exercise Vital Sign     Days of Exercise per Week: 3 days     Minutes of Exercise per Session: 30 min   Stress: No Stress Concern Present (5/8/2025)    Received from [facility]    [employer] Occup[employer] - Occupational Stress Questionnaire     Feeling of Stress : Only a little   Social Connections: Unknown (5/8/2025)    Received from [facility]    Social Connection and Isolation Panel [NHANES]     Frequency of Communication with Friends and Family: Patient declined     Frequency of Social Gatherings with Friends and Family: Patient declined     Attends [employer] Services: Patient declined     Active Member of Clubs or Organizations: Patient declined     Attends Club  or Organization Meetings: Patient declined     Marital Status:    Intimate Partner Violence: Not on file       Surgical History[3]      Electronically Signed      PAULA Royal MD      Orthopaedic Hand Surgery      [phone]       [1]   Past Medical History:  Diagnosis Date    Cervical disc disorder Bulging discs C4-6    Chronic pain disorder     CTS (carpal tunnel syndrome)     Extremity pain     Herpesviral infection of urogenital system, unspecified 01/26/2017    Recurrent genital herpes simplex type 2 infection    Joint pain     Low back pain     Neck pain    [2]   Allergies  Allergen Reactions    Anesthetics - Gwen Type- Parabens Nausea/vomiting and GI Upset     General Anesthetics    Amoxicillin-Pot Clavulanate Hives and Rash    Sulfamethoxazole-Trimethoprim GI Upset and Hives   [3]   Past Surgical History:  Procedure Laterality Date    EPIDURAL BLOCK INJECTION      TRIGGER POINT INJECTION  First one was 2/11/21      Frequency of Social Gatherings with Friends and Family: Patient declined     Attends [employer] Services: Patient declined     Active Member of Clubs or Organizations: Patient declined     Attends Club or Organization Meetings: Patient declined     Marital Status:    Intimate Partner Violence: Not on file       Surgical History[3]      PROCEDURE    Hand / UE Inj/Asp: bilateral carpal tunnel for carpal tunnel syndrome on 6/13/2025 5:32 PM  Indications: therapeutic and diagnostic  Details: 25 G needle, volar approach  Medications (Right): 30 mg triamcinolone acetonide 40 mg/mL; 0.75 mL lidocaine 10 mg/mL (1 %)  Medications (Left): 30 mg triamcinolone acetonide 40 mg/mL; 0.75 mL lidocaine 10 mg/mL (1 %)  Outcome: tolerated well, no immediate complications  Procedure, treatment alternatives, risks and benefits explained, specific risks discussed. Consent was given by the patient.           Electronically Signed      PAULA Royal MD      Orthopaedic Hand Surgery      [phone]         [1]   Past Medical History:  Diagnosis Date    Cervical disc disorder Bulging discs C4-6    Chronic pain disorder     CTS (carpal tunnel syndrome)     Extremity pain     Herpesviral infection of urogenital system, unspecified 01/26/2017    Recurrent genital herpes simplex type 2 infection    Joint pain     Low back pain     Neck pain    [2]   Allergies  Allergen Reactions    Anesthetics - Gwen Type- Parabens Nausea/vomiting and GI Upset     General Anesthetics    Amoxicillin-Pot Clavulanate Hives and Rash    Sulfamethoxazole-Trimethoprim GI Upset and Hives   [3]   Past Surgical History:  Procedure Laterality Date    EPIDURAL BLOCK INJECTION      TRIGGER POINT INJECTION  First one was 2/11/21

## 2025-06-13 NOTE — LETTER
June 28, 2025     Cuco Parada DO  [address] [facility] [address]    Patient: Morena Franco   YOB: 1995   Date of Visit: 6/13/2025       Dear Dr. Cuco Parada DO:    Thank you for referring Morena Franco to me for evaluation. Below are my notes for this consultation.  If you have questions, please do not hesitate to call me. I look forward to following your patient along with you.       Sincerely,     Hunter Royal MD      CC: Mary De Jesus, APRN-CNM  ______________________________________________________________________________________    CHIEF COMPLAINT         CTS    ASSESSMENT + PLAN    Bilateral carpal tunnel syndrome of pregnancy    The nature of carpal tunnel syndrome was reviewed, along with the natural history.  I reviewed the options for management, including observation, nonsteroidals, splinting, oral steroids, cortisone injection, or surgical release, along with the likely success rates of each.  I reviewed the risks of cortisone injection, including infection, hypopigmentation, and fat atrophy.  The transient effect on blood glucose measurement was also reviewed, and the patient wished to proceed.    I reviewed that the absolute safety of cortisone injection in pregnancy has never been definitively proven, but it is generally regarded as safe.    After sterile prep, I injected 30 mg of Kenalog and 0.75 cc of 1% lidocaine, plain to each carpal tunnel.    Patient will followup in 4 weeks if still symptomatic, or with any concerns, and will continue with the brace and nonsteroidals as needed.        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  This was present before pregnancy but has been radically worsening over the last couple of months.  She reports an EMG in the past.  She  has been using night splinting which initially gave good relief, but she is now waking with a positive shake sign even in the splint.  At this point she plans to go ahead with a cortisone injection to help give her relief through to delivery.    She is not diabetic or hypothyroid.  She does not smoke.      REVIEW OF SYSTEMS       A 30-item multi-system Review Of Systems was obtained on today's intake form.  This was reviewed with the patient and is correct.  The pertinent positives and negatives are listed above.  The form has been scanned separately into the medical record.      PHYSICAL EXAM    Constitutional:    Appears stated age. Well-developed and well-nourished pregnant female in no acute distress.  Psychiatric:         Pleasant normal mood and affect. Behavior is appropriate for the situation.   Head:                   Normocephalic and atraumatic.  Eyes:                    Pupils are equal and round.  Cardiovascular:  2+ radial and ulnar pulses. Fingers well-perfused.  Respiratory:        Effort normal. No respiratory distress. Speaking in complete sentences.  Neurologic:       Alert and oriented to person, place, and time.  Skin:                Skin is intact, warm and dry.  Hematologic / Lymphatic:    No lymphedema or lymphangitis.    Extremities / Musculoskeletal:                      Skin of both hands and wrists is intact with no erythema, ecchymosis, or diffuse swelling.  Normal skin drag and coloration.  Full composite finger flexion extension with good intrinsic plus minus posture.  5/5 APB and hand intrinsics bilaterally with no wasting.  Good symmetric wrist and forearm motion.  Positive Phalen and Durkan bilaterally but negative Tinel at wrists and elbows.  Negative elbow flexion test.  Ulnar nerve stable in the groove on both sides.  Cervical range of motion is good and does not cause discomfort into either hand.  Sensation intact to light touch in all distributions.  Capillary refill less than  2 seconds.      IMAGING / LABS / EMGs           None pertinent      Medical History[1]    Medication Documentation Review Audit       Reviewed by Irma Butler MA (Medical Assistant) on 06/11/25 at 0928      Medication Order Taking? Sig Documenting Provider Last Dose Status   aspirin 81 mg chewable tablet [id number]  Chew 1 tablet (81 mg) once daily. Mary BOWIE Neal, APRN-CNM  Active   DULoxetine (Cymbalta) 60 mg DR capsule [id number] No Take 1 capsule (60 mg) by mouth once daily. Historical Provider, MD Taking Active   ondansetron (Zofran) 8 mg tablet [id number]  Take 1 tablet (8 mg) by mouth every 8 hours if needed for nausea or vomiting for up to 15 doses. Brendan Batista MD  Active                    RX Allergies[2]    Social History     Socioeconomic History   • Marital status:      Spouse name: Not on file   • Number of children: Not on file   • Years of education: Not on file   • Highest education level: Not on file   Occupational History   • Not on file   Tobacco Use   • Smoking status: Never   • Smokeless tobacco: Never   Substance and Sexual Activity   • Alcohol use: Not Currently   • Drug use: Never   • Sexual activity: Yes     Partners: Male     Birth control/protection: Coitus interruptus, None   Other Topics Concern   • Not on file   Social History Narrative   • Not on file     Social Drivers of Health     Financial Resource Strain: Low Risk  (5/8/2025)    Received from [facility]    Overall Financial Resource Strain (CARDIA)    • Difficulty of Paying Living Expenses: Not very hard   Food Insecurity: No Food Insecurity (5/8/2025)    Received from [facility]    Hunger Vital Sign    • Worried About Running Out of Food in the Last Year: Never true    • Ran Out of Food in the Last Year: Never true   Transportation Needs: No Transportation Needs (5/8/2025)    Received from [facility]    PRAPARE - Transportation    • Lack of Transportation (Medical): No    • Lack of Transportation  (Non-Medical): No   Physical Activity: Insufficiently Active (5/8/2025)    Received from [facility]    Exercise Vital Sign    • Days of Exercise per Week: 3 days    • Minutes of Exercise per Session: 30 min   Stress: No Stress Concern Present (5/8/2025)    Received from [facility]    [employer] - Occupational Stress Questionnaire    • Feeling of Stress : Only a little   Social Connections: Unknown (5/8/2025)    Received from [facility]    Social Connection and Isolation Panel [NHANES]    • Frequency of Communication with Friends and Family: Patient declined    • Frequency of Social Gatherings with Friends and Family: Patient declined    • Attends Buddhist Services: Patient declined    • Active Member of Clubs or Organizations: Patient declined    • Attends Club or Organization Meetings: Patient declined    • Marital Status:    Intimate Partner Violence: Not on file       Surgical History[3]      PROCEDURE    Hand / UE Inj/Asp: bilateral carpal tunnel for carpal tunnel syndrome on 6/13/2025 5:32 PM  Indications: therapeutic and diagnostic  Details: 25 G needle, volar approach  Medications (Right): 30 mg triamcinolone acetonide 40 mg/mL; 0.75 mL lidocaine 10 mg/mL (1 %)  Medications (Left): 30 mg triamcinolone acetonide 40 mg/mL; 0.75 mL lidocaine 10 mg/mL (1 %)  Outcome: tolerated well, no immediate complications  Procedure, treatment alternatives, risks and benefits explained, specific risks discussed. Consent was given by the patient.           Electronically Signed      PAULA Royal MD      Orthopaedic Hand Surgery      [phone]         [1]  Past Medical History:  Diagnosis Date   • Cervical disc disorder Bulging discs C4-6   • Chronic pain disorder    • CTS (carpal tunnel syndrome)    • Extremity pain    • Herpesviral infection of urogenital system, unspecified 01/26/2017    Recurrent genital herpes simplex type 2 infection   • Joint pain    • Low  back pain    • Neck pain    [2]  Allergies  Allergen Reactions   • Anesthetics - Gwen Type- Parabens Nausea/vomiting and GI Upset     General Anesthetics   • Amoxicillin-Pot Clavulanate Hives and Rash   • Sulfamethoxazole-Trimethoprim GI Upset and Hives   [3]  Past Surgical History:  Procedure Laterality Date   • EPIDURAL BLOCK INJECTION     • TRIGGER POINT INJECTION  First one was 2/11/21

## 2025-06-20 DIAGNOSIS — O99.019 MATERNAL ANEMIA IN PREGNANCY, ANTEPARTUM: Primary | ICD-10-CM

## 2025-06-20 RX ORDER — FERROUS GLUCONATE 325 MG
38 TABLET ORAL
Qty: 90 TABLET | Refills: 3 | Status: SHIPPED | OUTPATIENT
Start: 2025-06-20 | End: 2026-06-20

## 2025-06-24 ENCOUNTER — HOSPITAL ENCOUNTER (OUTPATIENT)
Dept: RADIOLOGY | Facility: CLINIC | Age: 30
Discharge: HOME | End: 2025-06-24
Payer: COMMERCIAL

## 2025-06-24 DIAGNOSIS — O28.3 ABNORMAL FETAL ULTRASOUND: ICD-10-CM

## 2025-06-24 PROCEDURE — 76819 FETAL BIOPHYS PROFIL W/O NST: CPT

## 2025-06-24 PROCEDURE — 76816 OB US FOLLOW-UP PER FETUS: CPT

## 2025-06-24 PROCEDURE — 76819 FETAL BIOPHYS PROFIL W/O NST: CPT | Performed by: OBSTETRICS & GYNECOLOGY

## 2025-06-24 PROCEDURE — 76816 OB US FOLLOW-UP PER FETUS: CPT | Performed by: OBSTETRICS & GYNECOLOGY

## 2025-06-25 ENCOUNTER — ROUTINE PRENATAL (OUTPATIENT)
Dept: OBSTETRICS AND GYNECOLOGY | Facility: CLINIC | Age: 30
End: 2025-06-25
Payer: COMMERCIAL

## 2025-06-25 VITALS — DIASTOLIC BLOOD PRESSURE: 80 MMHG | BODY MASS INDEX: 34.9 KG/M2 | WEIGHT: 210 LBS | SYSTOLIC BLOOD PRESSURE: 120 MMHG

## 2025-06-25 DIAGNOSIS — B00.9 HERPES SIMPLEX VIRUS TYPE 2 (HSV-2) INFECTION AFFECTING PREGNANCY IN FIRST TRIMESTER (HHS-HCC): ICD-10-CM

## 2025-06-25 DIAGNOSIS — F41.9: ICD-10-CM

## 2025-06-25 DIAGNOSIS — O99.019 MATERNAL ANEMIA IN PREGNANCY, ANTEPARTUM: ICD-10-CM

## 2025-06-25 DIAGNOSIS — O09.00 PREGNANCY WITH HISTORY OF INFERTILITY, ANTEPARTUM (HHS-HCC): ICD-10-CM

## 2025-06-25 DIAGNOSIS — O28.3 ABNORMAL FETAL ULTRASOUND: Primary | ICD-10-CM

## 2025-06-25 DIAGNOSIS — O28.5 ABNORMAL GENETIC TEST DURING PREGNANCY: ICD-10-CM

## 2025-06-25 DIAGNOSIS — O98.511 HERPES SIMPLEX VIRUS TYPE 2 (HSV-2) INFECTION AFFECTING PREGNANCY IN FIRST TRIMESTER (HHS-HCC): ICD-10-CM

## 2025-06-25 DIAGNOSIS — Z3A.30 30 WEEKS GESTATION OF PREGNANCY (HHS-HCC): ICD-10-CM

## 2025-06-25 PROCEDURE — 90471 IMMUNIZATION ADMIN: CPT | Performed by: ADVANCED PRACTICE MIDWIFE

## 2025-06-25 PROCEDURE — 0501F PRENATAL FLOW SHEET: CPT | Performed by: ADVANCED PRACTICE MIDWIFE

## 2025-06-25 RX ORDER — LIDOCAINE HYDROCHLORIDE 10 MG/ML
0.75 INJECTION, SOLUTION INFILTRATION; PERINEURAL
Status: COMPLETED | OUTPATIENT
Start: 2025-06-13 | End: 2025-06-13

## 2025-06-25 RX ORDER — TRIAMCINOLONE ACETONIDE 40 MG/ML
30 INJECTION, SUSPENSION INTRA-ARTICULAR; INTRAMUSCULAR
Status: COMPLETED | OUTPATIENT
Start: 2025-06-13 | End: 2025-06-13

## 2025-06-25 NOTE — PROGRESS NOTES
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 No GDM  Reviewed growth US WNL, repeat at 36 weeks   Reviewed s/sx of PTL, warning signs, fetal movement counts, and when to call provider  Follow up in 2 week for a routine prenatal visit.    ULISES Garrett    Subjective     Morena Franco is a 30 y.o.  at 30w1d with a working estimated date of delivery of 2025, by Last Menstrual Period who presents for a routine prenatal visit. She denies vaginal bleeding, leakage of fluid, decreased fetal movements, or contractions.    Her pregnancy is complicated by:  Pregnancy Problems (from 25 to present)       Problem Noted Diagnosed Resolved    Maternal anemia in pregnancy, antepartum 2025 by ULISES Garrett  No    Priority:  Medium       Overview Signed 2025  8:54 AM by ULISES Garrett   2025: 10.7 at 28 weeks. Oral Iron, reassess at 34 weeks. SD           Abnormal fetal ultrasound 4/10/2025 by ULISES Garrett  No    Priority:  Medium       Overview Signed 4/10/2025  9:54 AM by ULISES Garrett   4/10/2025: Isolated decreased BPD/FL ratio is noted. This appears to be an isolated finding. Growth at 30 weeks SD           Abnormal genetic test during pregnancy 3/5/2025 by ULISES Garrett  No    Priority:  Medium       Overview Addendum 2025  7:40 AM by ULISES Garrett   2025: Partner testing negative. SD  3/5/2025: Abnormal carrier screen. 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 Partner testing recommended. Provide partner testing kit at next visit. SD            30 weeks gestation of pregnancy ([facility]) 1/29/2025 by ULISES Garrett  No    Priority:  Medium       Overview Addendum 2/25/2025  4:10 PM by ULISES Garrett   1/29/2025: Desired provider in labor: [] CNM  [] Physician   [x] Either Acceptable  [x] Blood Products: [x] Yes, accepts [] No, needs counseling  [x] Initial BMI: 30.88   [x] Prenatal Labs:   [x] Cervical Cancer Screening up to date: pt requests to complete postpartum   [x] Rh status: O pos  [x] Screen for IPV and Substance Use Risk:  [x] Genetic Screening (cfDNA):  Ordered: Low risk Male   [x] First Trimester Anatomy Screen (11-13.6 wks): Scheduled   [x] Baby ASA (initiated):  [x] Pregnancy dated by: CRL    [] Anatomy US: (19-20 wks)  [] Federal Sterilization consent signed (if indicated):  [] 1hr GCT at 24-28wks:  [] Rhogam (if indicated):   [] Fetal Surveillance (if indicated):  [] Tdap (27-32 wks, may be given up to 36 wks if initial window missed):   [] RSV (32-36 wks) (Sept. to end of Jan):     [x] Feeding Intentions: breast/bottle  [] Postpartum Birth control method:   [] GBS at 36 - 37 wks:  [] 39 weeks discussion of IOL vs. Expectant management:  [] Mode of delivery ( anticipated ):               Pregnancy with history of infertility, antepartum ([facility]) 1/29/2025 by ULISES Garrett  No    Priority:  Medium       Overview Signed 1/29/2025  9:05 AM by ULISES Garrett   1/29/2025: Pregnancy achieved via Clomid. SD           Family history of congenital heart defect 1/29/2025 by ULISES Garrett  No    Priority:  Medium       Overview Addendum 5/14/2025  8:51 AM by ULISES Garrett   5/14/2025: Normal fetal echo SD  1/29/2025: FOB brother born with transposition of the great arteries. Plan early anatomy and fetal echo. FOB born with kidney agenesis. SD           Herpes  "simplex virus type 2 (HSV-2) infection affecting pregnancy in first trimester ([facility]) 2025 by ULISES Garrett  No    Priority:  Medium       Overview Signed 2025  9:41 AM by ULISES Garrett   2025: Plan Valtrex at 36 weeks. 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 SD                    Objective   Physical Exam:   Weight: 95.3 kg (210 lb)  TW.6 kg (30 lb)  Expected Total Weight Gain: 7 kg (15 lb)-11.5 kg (25 lb)   Pregravid BMI: 29.92  BP: 120/80                  Postpartum Depression: Low Risk  (2025)    [place]  Depression Scale     Last EPDS Total Score: 0     Last EPDS Self Harm Result: Never        Prenatal Labs  Lab Results   Component Value Date    HGB 10.7 (L) 2025    HCT 32.6 (L) 2025     2025    ABO O 10/07/2024    LABRH POS 10/07/2024    NEISSGONOAMP Negative 10/07/2024    CHLAMTRACAMP Negative 10/07/2024    SYPHT Nonreactive 10/07/2024    HEPBSAG Nonreactive 10/07/2024    HIV1X2 Nonreactive 10/07/2024    URINECULTURE  2025     Growth indicates contamination with periurethral chuy. Repeat culture if clinically indicated.     No results found for: \"GLUC1P\"  No results found for: \"GRPBSTREP\"     Imaging  Reviewed sono from yesterday  36 week sono scheduled   MADDIE in 2 weeks      ULISES Garrett         "

## 2025-06-26 ENCOUNTER — TELEPHONE (OUTPATIENT)
Dept: OBSTETRICS AND GYNECOLOGY | Facility: CLINIC | Age: 30
End: 2025-06-26
Payer: COMMERCIAL

## 2025-06-26 NOTE — TELEPHONE ENCOUNTER
This patient would like a call regarding her sinus and congestion symptoms and the type of medications she can take please.    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      Thank You

## 2025-06-27 NOTE — TELEPHONE ENCOUNTER
Spoke with pt-30w3d, last MADDIE 6/25.   Pt c/o cold symptoms; runny nose, congestion.   Pt denies VB/LOF/contractions.   Reports good FM.   Reviewed OTC medications that may help; Mucinex, Tuan's Vapor rub, saline drops/mist, Flonase.  Pt also c/o painful rash in groin and kennedi area.   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   D/w pt she may be suffering from a yeast infection.  Advised OTC Monistat, apply a generous layer to affected area nightly x 7 nights.   Advised pt contact the office Monday if symptoms not improved for an appointment.   D/w pt calling on call over the weekend if symptoms worsen.   Pt verbalized understanding and is agreeable to plan.

## 2025-07-09 ENCOUNTER — ROUTINE PRENATAL (OUTPATIENT)
Dept: OBSTETRICS AND GYNECOLOGY | Facility: CLINIC | Age: 30
End: 2025-07-09
Payer: COMMERCIAL

## 2025-07-09 VITALS — DIASTOLIC BLOOD PRESSURE: 68 MMHG | SYSTOLIC BLOOD PRESSURE: 111 MMHG | BODY MASS INDEX: 35.24 KG/M2 | WEIGHT: 212 LBS

## 2025-07-09 DIAGNOSIS — O28.5 ABNORMAL GENETIC TEST DURING PREGNANCY: ICD-10-CM

## 2025-07-09 DIAGNOSIS — O28.3 ABNORMAL FETAL ULTRASOUND: ICD-10-CM

## 2025-07-09 DIAGNOSIS — B00.9 HERPES SIMPLEX VIRUS TYPE 2 (HSV-2) INFECTION AFFECTING PREGNANCY IN FIRST TRIMESTER (HHS-HCC): ICD-10-CM

## 2025-07-09 DIAGNOSIS — F41.9: ICD-10-CM

## 2025-07-09 DIAGNOSIS — B37.9 YEAST INFECTION: ICD-10-CM

## 2025-07-09 DIAGNOSIS — O98.511 HERPES SIMPLEX VIRUS TYPE 2 (HSV-2) INFECTION AFFECTING PREGNANCY IN FIRST TRIMESTER (HHS-HCC): ICD-10-CM

## 2025-07-09 DIAGNOSIS — O99.019 MATERNAL ANEMIA IN PREGNANCY, ANTEPARTUM: ICD-10-CM

## 2025-07-09 DIAGNOSIS — Z82.79 FAMILY HISTORY OF CONGENITAL HEART DEFECT: Primary | ICD-10-CM

## 2025-07-09 PROBLEM — Z3A.32 32 WEEKS GESTATION OF PREGNANCY (HHS-HCC): Status: ACTIVE | Noted: 2025-01-29

## 2025-07-09 PROCEDURE — 0501F PRENATAL FLOW SHEET: CPT | Performed by: ADVANCED PRACTICE MIDWIFE

## 2025-07-09 RX ORDER — FLUCONAZOLE 150 MG/1
150 TABLET ORAL ONCE
Qty: 1 TABLET | Refills: 0 | Status: SHIPPED | OUTPATIENT
Start: 2025-07-09 | End: 2025-07-09

## 2025-07-09 NOTE — PROGRESS NOTES
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  Sono scheduled for 36 weeks     ULISES Garrett    Subjective     Morena Franco is a 30 y.o.  at 32w1d with a working estimated date of delivery of 2025, by Last Menstrual Period who presents for a routine prenatal visit. She denies vaginal bleeding, leakage of fluid, decreased fetal movements, or contractions.    Her pregnancy is complicated by:  Pregnancy Problems (from 25 to present)       Problem Noted Diagnosed Resolved    Maternal anemia in pregnancy, antepartum 2025 by ULISES Garrett  No    Priority:  Medium       Overview Signed 2025  8:54 AM by ULISES Garrett   2025: 10.7 at 28 weeks. Oral Iron, reassess at 34 weeks. SD           Abnormal fetal ultrasound 4/10/2025 by ULISES Garrett  No    Priority:  Medium       Overview Signed 4/10/2025  9:54 AM by UILSES Garrett   4/10/2025: Isolated decreased BPD/FL ratio is noted. This appears to be an isolated finding. Growth at 30 weeks SD           Abnormal genetic test during pregnancy 3/5/2025 by ULISES Garrett  No    Priority:  Medium       Overview Addendum 2025  7:40 AM by ULISES Garrett   2025: Partner testing negative. SD  3/5/2025: Abnormal carrier screen. 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 Partner testing recommended. Provide partner testing kit at next  visit. SD            32 weeks gestation of pregnancy ([facility]) 1/29/2025 by ULISES Garrett  No    Priority:  Medium       Overview Addendum 6/25/2025  9:35 AM by ULISES Garrett   1/29/2025: Desired provider in labor: [] CNM  [] Physician   [x] Either Acceptable  [x] Blood Products: [x] Yes, accepts [] No, needs counseling  [x] Initial BMI: 30.88   [x] Prenatal Labs: Dexcom as alternative to 1 hr gct -reviewed, allk WNL, no /GDM  [x] Cervical Cancer Screening up to date: pt requests to complete postpartum   [x] Rh status: O pos  [x] Screen for IPV and Substance Use Risk:  [x] Genetic Screening (cfDNA):  Ordered: Low risk Male   [x] First Trimester Anatomy Screen (11-13.6 wks): Scheduled   [x] Baby ASA (initiated):  [x] Pregnancy dated by: CRL    [x] Anatomy US: (19-20 wks) normal anatomy   [] Federal Sterilization consent signed (if indicated):  [] 1hr GCT at 24-28wks:  [] Rhogam (if indicated):   [] Fetal Surveillance (if indicated):  [x] Tdap (27-32 wks, may be given up to 36 wks if initial window missed): 6/25/2025  [] RSV (32-36 wks) (Sept. to end of Jan):     [x] Feeding Intentions: breast/bottle  [] Postpartum Birth control method:   [] GBS at 36 - 37 wks:  [] 39 weeks discussion of IOL vs. Expectant management:  [] Mode of delivery ( anticipated ):               Pregnancy with history of infertility, antepartum ([facility]) 1/29/2025 by ULISES Garrett  No    Priority:  Medium       Overview Signed 1/29/2025  9:05 AM by ULISES Garrett   1/29/2025: Pregnancy achieved via Clomid. SD           Family history of congenital heart defect 1/29/2025 by ULISES Garrett  No    Priority:  Medium       Overview Addendum 5/14/2025  8:51 AM by ULISES Garrett   5/14/2025: Normal fetal echo SD  1/29/2025: FOB brother born with transposition of the great arteries. Plan early anatomy and fetal echo. FOB born with kidney agenesis. SD           Herpes simplex  "virus type 2 (HSV-2) infection affecting pregnancy in first trimester ([facility]) 2025 by ULISES Garrett  No    Priority:  Medium       Overview Signed 2025  9:41 AM by ULISES Garrett   2025: Plan Valtrex at 36 weeks. 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 SD                    Objective   Physical Exam:      TW.6 kg (30 lb)  Expected Total Weight Gain: 7 kg (15 lb)-11.5 kg (25 lb)   Pregravid BMI: 29.92                     Postpartum Depression: Low Risk  (2025)    [place]  Depression Scale     Last EPDS Total Score: 0     Last EPDS Self Harm Result: Never        Prenatal Labs  Lab Results   Component Value Date    HGB 10.7 (L) 2025    HCT 32.6 (L) 2025     2025    ABO O 10/07/2024    LABRH POS 10/07/2024    NEISSGONOAMP Negative 10/07/2024    CHLAMTRACAMP Negative 10/07/2024    SYPHT Nonreactive 10/07/2024    HEPBSAG Nonreactive 10/07/2024    HIV1X2 Nonreactive 10/07/2024    URINECULTURE  2025     Growth indicates contamination with periurethral chuy. Repeat culture if clinically indicated.     No results found for: \"GLUC1P\"  No results found for: \"GRPBSTREP\"     Imaging  36 week sono scheduled.   MADDIE in 2 weeks   ULISES Garrett       "

## 2025-07-23 ENCOUNTER — ROUTINE PRENATAL (OUTPATIENT)
Dept: OBSTETRICS AND GYNECOLOGY | Facility: CLINIC | Age: 30
End: 2025-07-23
Payer: COMMERCIAL

## 2025-07-23 VITALS — WEIGHT: 214 LBS | SYSTOLIC BLOOD PRESSURE: 110 MMHG | BODY MASS INDEX: 35.57 KG/M2 | DIASTOLIC BLOOD PRESSURE: 69 MMHG

## 2025-07-23 DIAGNOSIS — F41.9: ICD-10-CM

## 2025-07-23 DIAGNOSIS — Z82.79 FAMILY HISTORY OF CONGENITAL HEART DEFECT: Primary | ICD-10-CM

## 2025-07-23 DIAGNOSIS — O99.019 MATERNAL ANEMIA IN PREGNANCY, ANTEPARTUM: ICD-10-CM

## 2025-07-23 DIAGNOSIS — O98.511 HERPES SIMPLEX VIRUS TYPE 2 (HSV-2) INFECTION AFFECTING PREGNANCY IN FIRST TRIMESTER (HHS-HCC): ICD-10-CM

## 2025-07-23 DIAGNOSIS — B00.9 HERPES SIMPLEX VIRUS TYPE 2 (HSV-2) INFECTION AFFECTING PREGNANCY IN FIRST TRIMESTER (HHS-HCC): ICD-10-CM

## 2025-07-23 DIAGNOSIS — R21 RASH: ICD-10-CM

## 2025-07-23 PROBLEM — Z3A.34 34 WEEKS GESTATION OF PREGNANCY (HHS-HCC): Status: ACTIVE | Noted: 2025-01-29

## 2025-07-23 PROCEDURE — 0501F PRENATAL FLOW SHEET: CPT | Performed by: ADVANCED PRACTICE MIDWIFE

## 2025-07-23 RX ORDER — TRIAMCINOLONE ACETONIDE 1 MG/G
OINTMENT TOPICAL 2 TIMES DAILY
Qty: 30 G | Refills: 1 | Status: SHIPPED | OUTPATIENT
Start: 2025-07-23

## 2025-07-23 NOTE — PROGRESS NOTES
Assessment/Plan   Diagnoses and all orders for this visit:  Family history of congenital heart defect  Herpes simplex virus type 2 (HSV-2) infection affecting pregnancy in first trimester ([facility])  Anxiety disorder in mother affecting childbirth ([facility]-H[facility])  Maternal anemia in pregnancy, antepartum  -     CBC; Future    Rash on bilateral axilla. Discussed changing deodorants and script sent for Kenalog ointment to apply BID. If no improvement in next week refer to derm    Reviewed s/sx of PTL, warning signs, fetal movement counts, and when to call provider  Follow up in 2 week for a routine prenatal visit.    ULISES Garrett    Subjective     Morena Franco is a 30 y.o.  at 34w1d with a working estimated date of delivery of 2025, by Last Menstrual Period who presents for a routine prenatal visit. She denies vaginal bleeding, leakage of fluid, decreased fetal movements, or contractions.    Her pregnancy is complicated by:  Pregnancy Problems (from 25 to present)       Problem Noted Diagnosed Resolved    Maternal anemia in pregnancy, antepartum 2025 by ULISES Garrett  No    Priority:  Medium       Overview Signed 2025  8:54 AM by ULISES Garrett   2025: 10.7 at 28 weeks. Oral Iron, reassess at 34 weeks. SD           Abnormal fetal ultrasound 4/10/2025 by ULISES Garrett  No    Priority:  Medium       Overview Signed 4/10/2025  9:54 AM by ULISES Garrett   4/10/2025: Isolated decreased BPD/FL ratio is noted. This appears to be an isolated finding. Growth at 30 weeks SD           Abnormal genetic test during pregnancy 3/5/2025 by ULISES Garrett  No    Priority:  Medium       Overview Addendum 2025  7:40 AM by ULISES Garrett   2025: Partner testing negative. SD  3/5/2025: Abnormal carrier screen. 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 Partner testing recommended. Provide partner  testing kit at next visit. SD            34 weeks gestation of pregnancy ([facility]) 1/29/2025 by ULISES Garrett  No    Priority:  Medium       Overview Addendum 7/9/2025  9:13 AM by ULISES Garrett   1/29/2025: Desired provider in labor: [] CNM  [] Physician   [x] Either Acceptable  [x] Blood Products: [x] Yes, accepts [] No, needs counseling  [x] Initial BMI: 30.88   [x] Prenatal Labs: Dexcom as alternative to 1 hr gct -reviewed, allk WNL, no /GDM  [x] Cervical Cancer Screening up to date: pt requests to complete postpartum   [x] Rh status: O pos  [x] Screen for IPV and Substance Use Risk:  [x] Genetic Screening (cfDNA):  Ordered: Low risk Male   [x] First Trimester Anatomy Screen (11-13.6 wks): Scheduled   [x] Baby ASA (initiated):  [x] Pregnancy dated by: CRL    [x] Anatomy US: (19-20 wks) normal anatomy   [] Federal Sterilization consent signed (if indicated):  [x] 1hr GCT at 24-28wks: normal   [x] Rhogam (if indicated): O pos   [x] Fetal Surveillance (if indicated):serial growth   [x] Tdap (27-32 wks, may be given up to 36 wks if initial window missed): 6/25/2025  [] RSV (32-36 wks) (Sept. to end of Jan):     [x] Feeding Intentions: breast/bottle  [x] Postpartum Birth control method:   [] GBS at 36 - 37 wks:  [] 39 weeks discussion of IOL vs. Expectant management:  [] Mode of delivery ( anticipated ):               Pregnancy with history of infertility, antepartum ([facility]) 1/29/2025 by ULISES Garrett  No    Priority:  Medium       Overview Addendum 7/9/2025  8:28 AM by ULISES Garrett   7/9/2025: 30 weeks: 63%  1/29/2025: Pregnancy achieved via Clomid. SD           Family history of congenital heart defect 1/29/2025 by ULISES Garrett  No    Priority:  Medium       Overview Addendum 5/14/2025  8:51 AM by LANIE Garrett-JOANNE   5/14/2025: Normal fetal echo SD  1/29/2025: FOB brother born with transposition of the great arteries. Plan early anatomy  "and fetal echo. FOB born with kidney agenesis. SD           Herpes simplex virus type 2 (HSV-2) infection affecting pregnancy in first trimester ([facility]) 2025 by ULISES Garrett  No    Priority:  Medium       Overview Signed 2025  9:41 AM by ULISES Garrett   2025: Plan Valtrex at 36 weeks. SD           Anxiety disorder in mother affecting childbirth ([facility]) 2025 by ULISES Garrett  No    Priority:  Medium       Overview Signed 2025  9:43 AM by ULISES Garrett   2025: Taking daily medication and doing well at this time, plans to continue. SD                    Objective   Physical Exam:   Weight: 97.1 kg (214 lb)  TWG: 15.4 kg (34 lb)  Expected Total Weight Gain: 7 kg (15 lb)-11.5 kg (25 lb)   Pregravid BMI: 29.92  BP: 110/69                  Postpartum Depression: Low Risk  (2025)    [place]  Depression Scale     Last EPDS Total Score: 0     Last EPDS Self Harm Result: Never        Prenatal Labs  Lab Results   Component Value Date    HGB 10.7 (L) 2025    HCT 32.6 (L) 2025     2025    ABO O 10/07/2024    LABRH POS 10/07/2024    NEISSGONOAMP Negative 10/07/2024    CHLAMTRACAMP Negative 10/07/2024    SYPHT Nonreactive 10/07/2024    HEPBSAG Nonreactive 10/07/2024    HIV1X2 Nonreactive 10/07/2024    URINECULTURE  2025     Growth indicates contamination with periurethral chuy. Repeat culture if clinically indicated.     No results found for: \"GLUC1P\"  No results found for: \"GRPBSTREP\"     Imaging  36 week sono scheduled   MADDIE in 2 weeks     ULISES Garrett       "

## 2025-07-30 DIAGNOSIS — M54.30 SCIATIC NERVE PAIN, UNSPECIFIED LATERALITY: Primary | ICD-10-CM

## 2025-08-01 ENCOUNTER — HOSPITAL ENCOUNTER (OUTPATIENT)
Facility: HOSPITAL | Age: 30
End: 2025-08-01
Attending: OBSTETRICS & GYNECOLOGY | Admitting: OBSTETRICS & GYNECOLOGY
Payer: COMMERCIAL

## 2025-08-01 ENCOUNTER — HOSPITAL ENCOUNTER (OUTPATIENT)
Facility: HOSPITAL | Age: 30
Discharge: HOME | End: 2025-08-01
Attending: OBSTETRICS & GYNECOLOGY | Admitting: OBSTETRICS & GYNECOLOGY
Payer: COMMERCIAL

## 2025-08-01 VITALS
RESPIRATION RATE: 18 BRPM | DIASTOLIC BLOOD PRESSURE: 76 MMHG | TEMPERATURE: 98.4 F | HEART RATE: 101 BPM | SYSTOLIC BLOOD PRESSURE: 133 MMHG

## 2025-08-01 PROCEDURE — 99213 OFFICE O/P EST LOW 20 MIN: CPT

## 2025-08-01 PROCEDURE — 99213 OFFICE O/P EST LOW 20 MIN: CPT | Performed by: MIDWIFE

## 2025-08-01 PROCEDURE — 59025 FETAL NON-STRESS TEST: CPT | Performed by: MIDWIFE

## 2025-08-01 RX ORDER — LABETALOL HYDROCHLORIDE 5 MG/ML
20 INJECTION, SOLUTION INTRAVENOUS ONCE AS NEEDED
Status: DISCONTINUED | OUTPATIENT
Start: 2025-08-01 | End: 2025-08-01 | Stop reason: HOSPADM

## 2025-08-01 RX ORDER — ONDANSETRON HYDROCHLORIDE 2 MG/ML
4 INJECTION, SOLUTION INTRAVENOUS EVERY 6 HOURS PRN
Status: DISCONTINUED | OUTPATIENT
Start: 2025-08-01 | End: 2025-08-01 | Stop reason: HOSPADM

## 2025-08-01 RX ORDER — LIDOCAINE HYDROCHLORIDE 10 MG/ML
0.5 INJECTION, SOLUTION EPIDURAL; INFILTRATION; INTRACAUDAL; PERINEURAL ONCE AS NEEDED
Status: DISCONTINUED | OUTPATIENT
Start: 2025-08-01 | End: 2025-08-01 | Stop reason: HOSPADM

## 2025-08-01 RX ORDER — HYDRALAZINE HYDROCHLORIDE 20 MG/ML
5 INJECTION INTRAMUSCULAR; INTRAVENOUS ONCE AS NEEDED
Status: DISCONTINUED | OUTPATIENT
Start: 2025-08-01 | End: 2025-08-01 | Stop reason: HOSPADM

## 2025-08-01 RX ORDER — ONDANSETRON 4 MG/1
4 TABLET, FILM COATED ORAL EVERY 6 HOURS PRN
Status: DISCONTINUED | OUTPATIENT
Start: 2025-08-01 | End: 2025-08-01 | Stop reason: HOSPADM

## 2025-08-01 ASSESSMENT — PAIN SCALES - GENERAL: PAINLEVEL_OUTOF10: 6

## 2025-08-01 NOTE — PROGRESS NOTES
Obstetrical Triage Note    Reason for Triage Observation: Left sided sciatica that has been progressing over the past 1-2 weeks and makes it difficult to walk as she experiences numbness.    Assessment   Left sided sciatica and leg numbness  Triage Testing revealed Reactive NST   Patient's complaints have stayed the same.    Plan   Discharge home   Referred to outpatient walk-in ortho clinics and outpatient massage    Subjective   History of Present Pregnancy  Morena Franco is a 30 y.o.  gravid, female. Patient's last menstrual period was 2024 (exact date). with an Estimated Date of Delivery of 2025, by Last Menstrual Period, who is now 35w3d gestation. The patient's blood type is O POS.   Triage Course:  Patient reports she has a history of left sided sciatica. Over the past 1-2 weeks, symptoms on the left has worsened, which makes it difficult to walk. Denies calf pain/tenderness or SOB. Has tried a pregnancy support belt with some relief.    NST done --> reactive. Uterine irritability noted. Patient denies feeling pain/contractions.     Referred to outpatient ortho clinics. [facility] [employer] message sent with webpage containing all of 's available outpatient ortho walk-in clinics.    Referred and recommended prenatal chiropractic care, pregnancy support belts, K-tape.    Counseled s/s labor/ROM    Questions answered, patient verbalzies understanding and agrees to POC    Objective   Recent Vital Signs:                                 /76   Pulse 101   Temp 36.9 °C (98.4 °F) (Temporal)   Resp 18     BP & Temp Min/Max Last 24 Hours:     BP  Min: 133/76   Min taken time: 25 1217  Max: 133/76   Max taken time: 25 1217  Temp  Av.9 °C (98.4 °F)  Min: 36.9 °C (98.4 °F)   Min taken time: 25 1226  Max: 36.9 °C (98.4 °F)   Max taken time: 25 1226    Physical Examination:  GENERAL: Examination reveals a well developed, well nourished, gravid female in no acute distress. She is  alert and cooperative.  LUNGS: normal effort  FHR is  , with Accelerations, and a   tracing.    [place] reading:    NEUROLOGICAL: alert, oriented, normal speech, no focal findings or movement disorder noted  PSYCHOLOGICAL: awake and alert; oriented to person, place, and time

## 2025-08-01 NOTE — DISCHARGE SUMMARY
Discharge Summary    Admission Date: 8/1/2025  Discharge Date: 8/1/2025    Discharge Diagnosis  Sciatica    Hospital Course  Delivery Date: This patient has no babies on file. This patient has no babies on file.  Delivery type: This patient has no babies on file.   GA at delivery: 35w3d  Outcome: This patient has no babies on file.  Anesthesia during delivery: This patient has no babies on file.  Intrapartum complications: This patient has no babies on file.  Feeding method:           Pertinent Physical Exam At Time of Discharge  See note    Last Vitals:  Temp Pulse Resp BP MAP Pulse Ox   36.9 °C (98.4 °F) 101 18 133/76 96       Discharge Meds     Your medication list        CONTINUE taking these medications        Instructions Last Dose Given Next Dose Due   aspirin 81 mg chewable tablet      Chew 1 tablet (81 mg) once daily.       DULoxetine 60 mg DR capsule  Commonly known as: Cymbalta           ferrous gluconate 324 mg (38 mg elemental) tablet  Commonly known as: Fergon      Take 1 tablet by mouth once daily with breakfast.       ondansetron 8 mg tablet  Commonly known as: Zofran      Take 1 tablet (8 mg) by mouth every 8 hours if needed for nausea or vomiting for up to 15 doses.       triamcinolone 0.1 % ointment  Commonly known as: Kenalog      Apply topically 2 times a day.                 Complications Requiring Follow-Up  Sciatica    Test Results Pending At Discharge  Pending Labs       No current pending labs.            Outpatient Follow-Up  Future Appointments   Date Time Provider Department Center   8/6/2025  9:15 AM Mary De Jesus, APRN-TOMAM [id number] [facility]   8/8/2025 10:15 AM MAC [id number] OBGYNIMG ULTRASOUND 2 [id number] [facility]   8/13/2025  9:00 AM Mary De Jesus, APRN-CNM [id number] [facility]   8/20/2025  9:00 AM Mary De Jesus, APRN-CNM [id number] [facility]   8/27/2025  2:00 PM Mary De Jesus, APRN-CNM [id number] [facility]   9/3/2025  9:00 AM Mary De Jesus APRN-CNM [id number] [facility]    9/17/2025  9:00 AM Mary De Jesus, LANIE-TOMA [id number] [facility]             Amanda Rabago, LANIE-TOMA

## 2025-08-06 ENCOUNTER — ROUTINE PRENATAL (OUTPATIENT)
Dept: OBSTETRICS AND GYNECOLOGY | Facility: CLINIC | Age: 30
End: 2025-08-06
Payer: COMMERCIAL

## 2025-08-06 VITALS — WEIGHT: 215 LBS | BODY MASS INDEX: 35.73 KG/M2 | DIASTOLIC BLOOD PRESSURE: 56 MMHG | SYSTOLIC BLOOD PRESSURE: 100 MMHG

## 2025-08-06 DIAGNOSIS — F41.9: Primary | ICD-10-CM

## 2025-08-06 DIAGNOSIS — Z3A.36 36 WEEKS GESTATION OF PREGNANCY (HHS-HCC): ICD-10-CM

## 2025-08-06 DIAGNOSIS — O99.019 MATERNAL ANEMIA IN PREGNANCY, ANTEPARTUM: ICD-10-CM

## 2025-08-06 DIAGNOSIS — O98.511 HERPES SIMPLEX VIRUS TYPE 2 (HSV-2) INFECTION AFFECTING PREGNANCY IN FIRST TRIMESTER (HHS-HCC): ICD-10-CM

## 2025-08-06 DIAGNOSIS — B00.9 HERPES SIMPLEX VIRUS TYPE 2 (HSV-2) INFECTION AFFECTING PREGNANCY IN FIRST TRIMESTER (HHS-HCC): ICD-10-CM

## 2025-08-06 DIAGNOSIS — O09.00 PREGNANCY WITH HISTORY OF INFERTILITY, ANTEPARTUM (HHS-HCC): ICD-10-CM

## 2025-08-06 PROCEDURE — 99212 OFFICE O/P EST SF 10 MIN: CPT

## 2025-08-06 PROCEDURE — 0501F PRENATAL FLOW SHEET: CPT | Performed by: ADVANCED PRACTICE MIDWIFE

## 2025-08-06 NOTE — PROGRESS NOTES
Assessment/Plan   Diagnoses and all orders for this visit:  Anxiety disorder in mother affecting childbirth ([facility])  Herpes simplex virus type 2 (HSV-2) infection affecting pregnancy in first trimester ([facility])  Maternal anemia in pregnancy, antepartum  Pregnancy with history of infertility, antepartum ([facility])  36 weeks gestation of pregnancy ([facility])  -     QUEST CULTURE, GROUP B STREP WITH SUSCEPTIBILITY      Reviewed s/sx of PTL, warning signs, fetal movement counts, and when to call provider  Follow up in 1 week for a routine prenatal visit.    ULISES Garrett    Subjective     Morena Franco is a 30 y.o.  at 36w1d with a working estimated date of delivery of 2025, by Last Menstrual Period who presents for a routine prenatal visit. She denies vaginal bleeding, leakage of fluid, decreased fetal movements, or contractions.    Her pregnancy is complicated by:  Pregnancy Problems (from 25 to present)       Problem Noted Diagnosed Resolved    Maternal anemia in pregnancy, antepartum 2025 by ULISES Garrett  No    Priority:  Medium       Overview Signed 2025  8:54 AM by ULISES Garrett   2025: 10.7 at 28 weeks. Oral Iron, reassess at 34 weeks. SD           Abnormal fetal ultrasound 4/10/2025 by ULISES Garrett  No    Priority:  Medium       Overview Addendum 2025  9:16 AM by ULISES Garrett   2025: Growth at 30 weeks 63%  4/10/2025: Isolated decreased BPD/FL ratio is noted. This appears to be an isolated finding. Growth at 30 weeks SD           Abnormal genetic test during pregnancy 3/5/2025 by ULISES Garrett  No    Priority:  Medium       Overview Addendum 2025  7:40 AM by ULISES Garrett   2025: Partner testing negative. SD  3/5/2025: Abnormal carrier screen. 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 Partner testing recommended. Provide partner testing kit at next  visit. SD            36 weeks gestation of pregnancy ([facility]) 1/29/2025 by ULISES Garrett  No    Priority:  Medium       Overview Addendum 7/9/2025  9:13 AM by ULISES Garrett   1/29/2025: Desired provider in labor: [] CNM  [] Physician   [x] Either Acceptable  [x] Blood Products: [x] Yes, accepts [] No, needs counseling  [x] Initial BMI: 30.88   [x] Prenatal Labs: Dexcom as alternative to 1 hr gct -reviewed, allk WNL, no /GDM  [x] Cervical Cancer Screening up to date: pt requests to complete postpartum   [x] Rh status: O pos  [x] Screen for IPV and Substance Use Risk:  [x] Genetic Screening (cfDNA):  Ordered: Low risk Male   [x] First Trimester Anatomy Screen (11-13.6 wks): Scheduled   [x] Baby ASA (initiated):  [x] Pregnancy dated by: CRL    [x] Anatomy US: (19-20 wks) normal anatomy   [] Federal Sterilization consent signed (if indicated):  [x] 1hr GCT at 24-28wks: normal   [x] Rhogam (if indicated): O pos   [x] Fetal Surveillance (if indicated):serial growth   [x] Tdap (27-32 wks, may be given up to 36 wks if initial window missed): 6/25/2025  [] RSV (32-36 wks) (Sept. to end of Jan):     [x] Feeding Intentions: breast/bottle  [x] Postpartum Birth control method:   [] GBS at 36 - 37 wks:  [] 39 weeks discussion of IOL vs. Expectant management:  [] Mode of delivery ( anticipated ):               Pregnancy with history of infertility, antepartum ([facility]) 1/29/2025 by ULISES Garrett  No    Priority:  Medium       Overview Addendum 7/9/2025  8:28 AM by ULISES Garrett   7/9/2025: 30 weeks: 63%  1/29/2025: Pregnancy achieved via Clomid. SD           Family history of congenital heart defect 1/29/2025 by ULISES Garrett  No    Priority:  Medium       Overview Addendum 5/14/2025  8:51 AM by Mary De Jesus, LANIE-JOANNE   5/14/2025: Normal fetal echo SD  1/29/2025: FOB brother born with transposition of the great arteries. Plan early anatomy and fetal echo. FOB  "born with kidney agenesis. SD           Herpes simplex virus type 2 (HSV-2) infection affecting pregnancy in first trimester ([facility]) 2025 by ULISES Garrett  No    Priority:  Medium       Overview Signed 2025  9:41 AM by ULISES Garrett   2025: Plan Valtrex at 36 weeks. 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 SD                    Objective   Physical Exam:      TWG: 15.4 kg (34 lb)  Expected Total Weight Gain: 7 kg (15 lb)-11.5 kg (25 lb)   Pregravid BMI: 29.92                     Postpartum Depression: Low Risk  (2025)    [place]  Depression Scale     Last EPDS Total Score: 0     Last EPDS Self Harm Result: Never        Prenatal Labs  Lab Results   Component Value Date    HGB 10.7 (L) 2025    HCT 32.6 (L) 2025     2025    ABO O 10/07/2024    LABRH POS 10/07/2024    NEISSGONOAMP Negative 10/07/2024    CHLAMTRACAMP Negative 10/07/2024    SYPHT Nonreactive 10/07/2024    HEPBSAG Nonreactive 10/07/2024    HIV1X2 Nonreactive 10/07/2024    URINECULTURE  2025     Growth indicates contamination with periurethral chuy. Repeat culture if clinically indicated.     No results found for: \"GLUC1P\"  No results found for: \"GRPBSTREP\"     Imaging  Growth sono scheduled for 2025  MADDIE weekly     ULISES Garrett            "

## 2025-08-08 ENCOUNTER — HOSPITAL ENCOUNTER (OUTPATIENT)
Dept: RADIOLOGY | Facility: CLINIC | Age: 30
Discharge: HOME | End: 2025-08-08
Payer: COMMERCIAL

## 2025-08-08 DIAGNOSIS — O28.3 ABNORMAL FETAL ULTRASOUND: ICD-10-CM

## 2025-08-08 PROCEDURE — 76816 OB US FOLLOW-UP PER FETUS: CPT

## 2025-08-10 PROBLEM — O36.60X0: Status: ACTIVE | Noted: 2025-08-10

## 2025-08-11 LAB — GP B STREP SPEC QL CULT: NORMAL

## 2025-08-13 ENCOUNTER — TELEPHONE (OUTPATIENT)
Dept: OBSTETRICS AND GYNECOLOGY | Facility: CLINIC | Age: 30
End: 2025-08-13

## 2025-08-13 ENCOUNTER — ROUTINE PRENATAL (OUTPATIENT)
Dept: OBSTETRICS AND GYNECOLOGY | Facility: CLINIC | Age: 30
End: 2025-08-13
Payer: COMMERCIAL

## 2025-08-13 VITALS — DIASTOLIC BLOOD PRESSURE: 80 MMHG | BODY MASS INDEX: 35.73 KG/M2 | SYSTOLIC BLOOD PRESSURE: 110 MMHG | WEIGHT: 215 LBS

## 2025-08-13 DIAGNOSIS — F41.9: ICD-10-CM

## 2025-08-13 DIAGNOSIS — O98.511 HERPES SIMPLEX VIRUS TYPE 2 (HSV-2) INFECTION AFFECTING PREGNANCY IN FIRST TRIMESTER (HHS-HCC): ICD-10-CM

## 2025-08-13 DIAGNOSIS — O36.60X1: Primary | ICD-10-CM

## 2025-08-13 DIAGNOSIS — O28.3 ABNORMAL FETAL ULTRASOUND: ICD-10-CM

## 2025-08-13 DIAGNOSIS — Z82.79 FAMILY HISTORY OF CONGENITAL HEART DEFECT: ICD-10-CM

## 2025-08-13 DIAGNOSIS — O99.019 MATERNAL ANEMIA IN PREGNANCY, ANTEPARTUM: ICD-10-CM

## 2025-08-13 DIAGNOSIS — B00.9 HERPES SIMPLEX VIRUS TYPE 2 (HSV-2) INFECTION AFFECTING PREGNANCY IN FIRST TRIMESTER (HHS-HCC): ICD-10-CM

## 2025-08-13 PROBLEM — Z3A.37 37 WEEKS GESTATION OF PREGNANCY (HHS-HCC): Status: ACTIVE | Noted: 2025-01-29

## 2025-08-13 LAB
POC BLOOD, URINE: NEGATIVE
POC GLUCOSE, URINE: NEGATIVE MG/DL
POC KETONES, URINE: NEGATIVE MG/DL
POC LEUKOCYTES, URINE: NEGATIVE
POC NITRITE,URINE: NEGATIVE
POC PROTEIN, URINE: ABNORMAL MG/DL

## 2025-08-13 PROCEDURE — 99212 OFFICE O/P EST SF 10 MIN: CPT

## 2025-08-13 PROCEDURE — 0501F PRENATAL FLOW SHEET: CPT | Performed by: ADVANCED PRACTICE MIDWIFE

## 2025-08-13 PROCEDURE — 81003 URINALYSIS AUTO W/O SCOPE: CPT | Mod: QW | Performed by: ADVANCED PRACTICE MIDWIFE

## 2025-08-13 RX ORDER — CYCLOBENZAPRINE HCL 10 MG
5 TABLET ORAL 3 TIMES DAILY PRN
Qty: 30 TABLET | Refills: 1 | Status: SHIPPED | OUTPATIENT
Start: 2025-08-13 | End: 2025-09-02

## 2025-08-14 LAB
ERYTHROCYTE [DISTWIDTH] IN BLOOD BY AUTOMATED COUNT: 13.5 % (ref 11–15)
HCT VFR BLD AUTO: 38.5 % (ref 35–45)
HGB BLD-MCNC: 12.7 G/DL (ref 11.7–15.5)
MCH RBC QN AUTO: 30.2 PG (ref 27–33)
MCHC RBC AUTO-ENTMCNC: 33 G/DL (ref 32–36)
MCV RBC AUTO: 91.7 FL (ref 80–100)
PLATELET # BLD AUTO: 197 THOUSAND/UL (ref 140–400)
PMV BLD REES-ECKER: 10.6 FL (ref 7.5–12.5)
RBC # BLD AUTO: 4.2 MILLION/UL (ref 3.8–5.1)
WBC # BLD AUTO: 7.5 THOUSAND/UL (ref 3.8–10.8)

## 2025-08-16 LAB — BACTERIA UR CULT: NORMAL

## 2025-08-20 ENCOUNTER — ROUTINE PRENATAL (OUTPATIENT)
Dept: OBSTETRICS AND GYNECOLOGY | Facility: CLINIC | Age: 30
End: 2025-08-20
Payer: COMMERCIAL

## 2025-08-20 VITALS — DIASTOLIC BLOOD PRESSURE: 79 MMHG | WEIGHT: 218.8 LBS | SYSTOLIC BLOOD PRESSURE: 126 MMHG | BODY MASS INDEX: 36.37 KG/M2

## 2025-08-20 DIAGNOSIS — O99.019 MATERNAL ANEMIA IN PREGNANCY, ANTEPARTUM: ICD-10-CM

## 2025-08-20 DIAGNOSIS — O36.60X1: ICD-10-CM

## 2025-08-20 DIAGNOSIS — F41.9: ICD-10-CM

## 2025-08-20 DIAGNOSIS — O28.3 ABNORMAL FETAL ULTRASOUND: Primary | ICD-10-CM

## 2025-08-20 DIAGNOSIS — Z3A.38 38 WEEKS GESTATION OF PREGNANCY (HHS-HCC): ICD-10-CM

## 2025-08-20 PROBLEM — M54.32 LEFT SCIATIC NERVE PAIN: Status: ACTIVE | Noted: 2025-08-20

## 2025-08-20 PROCEDURE — 99212 OFFICE O/P EST SF 10 MIN: CPT

## 2025-08-20 PROCEDURE — 0501F PRENATAL FLOW SHEET: CPT | Performed by: ADVANCED PRACTICE MIDWIFE

## 2025-08-20 ASSESSMENT — ENCOUNTER SYMPTOMS
DEPRESSION: 0
LOSS OF SENSATION IN FEET: 0
OCCASIONAL FEELINGS OF UNSTEADINESS: 0

## 2025-08-27 ENCOUNTER — ROUTINE PRENATAL (OUTPATIENT)
Dept: OBSTETRICS AND GYNECOLOGY | Facility: CLINIC | Age: 30
End: 2025-08-27
Payer: COMMERCIAL

## 2025-08-27 ENCOUNTER — TELEPHONE (OUTPATIENT)
Dept: OBSTETRICS AND GYNECOLOGY | Facility: CLINIC | Age: 30
End: 2025-08-27

## 2025-08-27 VITALS — BODY MASS INDEX: 36.73 KG/M2 | DIASTOLIC BLOOD PRESSURE: 71 MMHG | SYSTOLIC BLOOD PRESSURE: 120 MMHG | WEIGHT: 221 LBS

## 2025-08-27 DIAGNOSIS — Z82.79 FAMILY HISTORY OF CONGENITAL HEART DEFECT: ICD-10-CM

## 2025-08-27 DIAGNOSIS — F41.9: Primary | ICD-10-CM

## 2025-08-27 DIAGNOSIS — O36.60X1: ICD-10-CM

## 2025-08-27 DIAGNOSIS — M54.32 LEFT SCIATIC NERVE PAIN: ICD-10-CM

## 2025-08-27 DIAGNOSIS — O99.019 MATERNAL ANEMIA IN PREGNANCY, ANTEPARTUM: ICD-10-CM

## 2025-08-27 PROCEDURE — 99212 OFFICE O/P EST SF 10 MIN: CPT

## 2025-08-27 PROCEDURE — 0501F PRENATAL FLOW SHEET: CPT | Performed by: ADVANCED PRACTICE MIDWIFE

## 2025-08-31 ENCOUNTER — APPOINTMENT (OUTPATIENT)
Dept: OBSTETRICS AND GYNECOLOGY | Facility: HOSPITAL | Age: 30
End: 2025-08-31
Payer: COMMERCIAL

## 2025-08-31 ENCOUNTER — HOSPITAL ENCOUNTER (INPATIENT)
Facility: HOSPITAL | Age: 30
LOS: 4 days | Discharge: HOME | End: 2025-09-04
Attending: OBSTETRICS & GYNECOLOGY | Admitting: MIDWIFE
Payer: COMMERCIAL

## 2025-08-31 DIAGNOSIS — F41.9: ICD-10-CM

## 2025-08-31 DIAGNOSIS — Z37.9 VACUUM-ASSISTED VAGINAL DELIVERY (HHS-HCC): Primary | ICD-10-CM

## 2025-08-31 DIAGNOSIS — O13.9 GESTATIONAL HYPERTENSION, ANTEPARTUM (HHS-HCC): ICD-10-CM

## 2025-08-31 PROBLEM — M79.7 FIBROMYALGIA: Status: RESOLVED | Noted: 2023-12-06 | Resolved: 2025-08-31

## 2025-08-31 PROBLEM — Z3A.39 39 WEEKS GESTATION OF PREGNANCY (HHS-HCC): Status: ACTIVE | Noted: 2025-01-29

## 2025-08-31 PROBLEM — Z34.90 ENCOUNTER FOR INDUCTION OF LABOR: Status: ACTIVE | Noted: 2025-08-31

## 2025-08-31 LAB
ABO GROUP (TYPE) IN BLOOD: NORMAL
ANTIBODY SCREEN: NORMAL
ERYTHROCYTE [DISTWIDTH] IN BLOOD BY AUTOMATED COUNT: 13.2 % (ref 11.5–14.5)
HCT VFR BLD AUTO: 33.3 % (ref 36–46)
HGB BLD-MCNC: 11.4 G/DL (ref 12–16)
MCH RBC QN AUTO: 29.5 PG (ref 26–34)
MCHC RBC AUTO-ENTMCNC: 34.2 G/DL (ref 32–36)
MCV RBC AUTO: 86 FL (ref 80–100)
NRBC BLD-RTO: 0 /100 WBCS (ref 0–0)
PLATELET # BLD AUTO: 159 X10*3/UL (ref 150–450)
RBC # BLD AUTO: 3.86 X10*6/UL (ref 4–5.2)
RH FACTOR (ANTIGEN D): NORMAL
WBC # BLD AUTO: 6.9 X10*3/UL (ref 4.4–11.3)

## 2025-08-31 PROCEDURE — 85027 COMPLETE CBC AUTOMATED: CPT | Performed by: MIDWIFE

## 2025-08-31 PROCEDURE — 2500000004 HC RX 250 GENERAL PHARMACY W/ HCPCS (ALT 636 FOR OP/ED): Performed by: MIDWIFE

## 2025-08-31 PROCEDURE — 86780 TREPONEMA PALLIDUM: CPT | Mod: AHULAB | Performed by: MIDWIFE

## 2025-08-31 PROCEDURE — 86900 BLOOD TYPING SEROLOGIC ABO: CPT | Performed by: MIDWIFE

## 2025-08-31 PROCEDURE — 7210000002 HC LABOR PER HOUR

## 2025-08-31 PROCEDURE — 36415 COLL VENOUS BLD VENIPUNCTURE: CPT | Performed by: MIDWIFE

## 2025-08-31 PROCEDURE — 1120000001 HC OB PRIVATE ROOM DAILY

## 2025-08-31 PROCEDURE — 2500000001 HC RX 250 WO HCPCS SELF ADMINISTERED DRUGS (ALT 637 FOR MEDICARE OP): Performed by: ADVANCED PRACTICE MIDWIFE

## 2025-08-31 RX ORDER — CALCIUM CARBONATE 200(500)MG
1 TABLET,CHEWABLE ORAL EVERY 6 HOURS PRN
Status: DISCONTINUED | OUTPATIENT
Start: 2025-08-31 | End: 2025-09-01

## 2025-08-31 RX ORDER — LABETALOL HYDROCHLORIDE 5 MG/ML
20 INJECTION, SOLUTION INTRAVENOUS ONCE AS NEEDED
Status: DISCONTINUED | OUTPATIENT
Start: 2025-08-31 | End: 2025-09-01

## 2025-08-31 RX ORDER — CARBOPROST TROMETHAMINE 250 UG/ML
250 INJECTION, SOLUTION INTRAMUSCULAR ONCE AS NEEDED
Status: DISCONTINUED | OUTPATIENT
Start: 2025-08-31 | End: 2025-09-01

## 2025-08-31 RX ORDER — HYDRALAZINE HYDROCHLORIDE 20 MG/ML
5 INJECTION INTRAMUSCULAR; INTRAVENOUS ONCE AS NEEDED
Status: DISCONTINUED | OUTPATIENT
Start: 2025-08-31 | End: 2025-09-01

## 2025-08-31 RX ORDER — ONDANSETRON 4 MG/1
4 TABLET, FILM COATED ORAL EVERY 6 HOURS PRN
Status: DISCONTINUED | OUTPATIENT
Start: 2025-08-31 | End: 2025-09-01

## 2025-08-31 RX ORDER — LIDOCAINE HYDROCHLORIDE 10 MG/ML
30 INJECTION, SOLUTION INFILTRATION; PERINEURAL ONCE AS NEEDED
Status: DISCONTINUED | OUTPATIENT
Start: 2025-08-31 | End: 2025-09-01

## 2025-08-31 RX ORDER — OXYTOCIN 10 [USP'U]/ML
10 INJECTION, SOLUTION INTRAMUSCULAR; INTRAVENOUS ONCE AS NEEDED
Status: DISCONTINUED | OUTPATIENT
Start: 2025-08-31 | End: 2025-09-01

## 2025-08-31 RX ORDER — TERBUTALINE SULFATE 1 MG/ML
0.25 INJECTION SUBCUTANEOUS ONCE AS NEEDED
Status: DISCONTINUED | OUTPATIENT
Start: 2025-08-31 | End: 2025-09-01

## 2025-08-31 RX ORDER — METHYLERGONOVINE MALEATE 0.2 MG/ML
0.2 INJECTION INTRAVENOUS ONCE AS NEEDED
Status: COMPLETED | OUTPATIENT
Start: 2025-08-31 | End: 2025-09-01

## 2025-08-31 RX ORDER — LOPERAMIDE HYDROCHLORIDE 2 MG/1
4 CAPSULE ORAL EVERY 2 HOUR PRN
Status: DISCONTINUED | OUTPATIENT
Start: 2025-08-31 | End: 2025-09-01

## 2025-08-31 RX ORDER — NIFEDIPINE 10 MG/1
10 CAPSULE ORAL ONCE AS NEEDED
Status: DISCONTINUED | OUTPATIENT
Start: 2025-08-31 | End: 2025-09-01

## 2025-08-31 RX ORDER — TRANEXAMIC ACID 1 G/10ML
1000 INJECTION, SOLUTION INTRAVENOUS ONCE AS NEEDED
Status: DISCONTINUED | OUTPATIENT
Start: 2025-08-31 | End: 2025-09-01

## 2025-08-31 RX ORDER — DULOXETIN HYDROCHLORIDE 30 MG/1
60 CAPSULE, DELAYED RELEASE ORAL NIGHTLY
Status: DISCONTINUED | OUTPATIENT
Start: 2025-08-31 | End: 2025-09-04 | Stop reason: HOSPADM

## 2025-08-31 RX ORDER — SODIUM CHLORIDE, SODIUM LACTATE, POTASSIUM CHLORIDE, CALCIUM CHLORIDE 600; 310; 30; 20 MG/100ML; MG/100ML; MG/100ML; MG/100ML
75 INJECTION, SOLUTION INTRAVENOUS CONTINUOUS
Status: DISCONTINUED | OUTPATIENT
Start: 2025-08-31 | End: 2025-09-01

## 2025-08-31 RX ORDER — ONDANSETRON HYDROCHLORIDE 2 MG/ML
4 INJECTION, SOLUTION INTRAVENOUS EVERY 6 HOURS PRN
Status: DISCONTINUED | OUTPATIENT
Start: 2025-08-31 | End: 2025-09-01

## 2025-08-31 RX ORDER — MISOPROSTOL 200 UG/1
800 TABLET ORAL ONCE AS NEEDED
Status: COMPLETED | OUTPATIENT
Start: 2025-08-31 | End: 2025-09-01

## 2025-08-31 RX ORDER — OXYTOCIN/0.9 % SODIUM CHLORIDE 30/500 ML
60 PLASTIC BAG, INJECTION (ML) INTRAVENOUS ONCE AS NEEDED
Status: DISCONTINUED | OUTPATIENT
Start: 2025-08-31 | End: 2025-09-01

## 2025-08-31 RX ORDER — OXYTOCIN/0.9 % SODIUM CHLORIDE 30/500 ML
2-30 PLASTIC BAG, INJECTION (ML) INTRAVENOUS CONTINUOUS
Status: DISCONTINUED | OUTPATIENT
Start: 2025-08-31 | End: 2025-09-01

## 2025-08-31 RX ADMIN — Medication 2 MILLI-UNITS/MIN: at 20:18

## 2025-08-31 RX ADMIN — DULOXETINE HYDROCHLORIDE 60 MG: 30 CAPSULE, DELAYED RELEASE ORAL at 22:11

## 2025-08-31 RX ADMIN — SODIUM CHLORIDE, SODIUM LACTATE, POTASSIUM CHLORIDE, AND CALCIUM CHLORIDE 75 ML/HR: .6; .31; .03; .02 INJECTION, SOLUTION INTRAVENOUS at 20:18

## 2025-08-31 SDOH — HEALTH STABILITY: MENTAL HEALTH: WISH TO BE DEAD (PAST 1 MONTH): NO

## 2025-08-31 SDOH — HEALTH STABILITY: MENTAL HEALTH: SUICIDAL BEHAVIOR (LIFETIME): NO

## 2025-08-31 SDOH — ECONOMIC STABILITY: FOOD INSECURITY: WITHIN THE PAST 12 MONTHS, THE FOOD YOU BOUGHT JUST DIDN'T LAST AND YOU DIDN'T HAVE MONEY TO GET MORE.: NEVER TRUE

## 2025-08-31 SDOH — SOCIAL STABILITY: SOCIAL INSECURITY: WITHIN THE LAST YEAR, HAVE YOU BEEN HUMILIATED OR EMOTIONALLY ABUSED IN OTHER WAYS BY YOUR PARTNER OR EX-PARTNER?: NO

## 2025-08-31 SDOH — HEALTH STABILITY: MENTAL HEALTH: NON-SPECIFIC ACTIVE SUICIDAL THOUGHTS (PAST 1 MONTH): NO

## 2025-08-31 SDOH — SOCIAL STABILITY: SOCIAL INSECURITY
WITHIN THE LAST YEAR, HAVE YOU BEEN KICKED, HIT, SLAPPED, OR OTHERWISE PHYSICALLY HURT BY YOUR PARTNER OR EX-PARTNER?: NO

## 2025-08-31 SDOH — SOCIAL STABILITY: SOCIAL INSECURITY: WITHIN THE LAST YEAR, HAVE YOU BEEN AFRAID OF YOUR PARTNER OR EX-PARTNER?: NO

## 2025-08-31 SDOH — SOCIAL STABILITY: SOCIAL INSECURITY: PHYSICAL ABUSE: DENIES

## 2025-08-31 SDOH — SOCIAL STABILITY: SOCIAL INSECURITY: VERBAL ABUSE: DENIES

## 2025-08-31 SDOH — SOCIAL STABILITY: SOCIAL INSECURITY: HAVE YOU HAD THOUGHTS OF HARMING ANYONE ELSE?: NO

## 2025-08-31 SDOH — ECONOMIC STABILITY: FOOD INSECURITY: WITHIN THE PAST 12 MONTHS, YOU WORRIED THAT YOUR FOOD WOULD RUN OUT BEFORE YOU GOT THE MONEY TO BUY MORE.: NEVER TRUE

## 2025-08-31 SDOH — SOCIAL STABILITY: SOCIAL INSECURITY: ARE YOU OR HAVE YOU BEEN THREATENED OR ABUSED PHYSICALLY, EMOTIONALLY, OR SEXUALLY BY ANYONE?: NO

## 2025-08-31 SDOH — SOCIAL STABILITY: SOCIAL INSECURITY
WITHIN THE LAST YEAR, HAVE YOU BEEN RAPED OR FORCED TO HAVE ANY KIND OF SEXUAL ACTIVITY BY YOUR PARTNER OR EX-PARTNER?: NO

## 2025-08-31 SDOH — ECONOMIC STABILITY: HOUSING INSECURITY: DO YOU FEEL UNSAFE GOING BACK TO THE PLACE WHERE YOU ARE LIVING?: NO

## 2025-08-31 SDOH — SOCIAL STABILITY: SOCIAL INSECURITY: ABUSE SCREEN: ADULT

## 2025-08-31 SDOH — SOCIAL STABILITY: SOCIAL INSECURITY: HAS ANYONE EVER THREATENED TO HURT YOUR FAMILY OR YOUR PETS?: NO

## 2025-08-31 SDOH — SOCIAL STABILITY: SOCIAL INSECURITY: HAVE YOU HAD ANY THOUGHTS OF HARMING ANYONE ELSE?: NO

## 2025-08-31 SDOH — SOCIAL STABILITY: SOCIAL INSECURITY: DO YOU FEEL ANYONE HAS EXPLOITED OR TAKEN ADVANTAGE OF YOU FINANCIALLY OR OF YOUR PERSONAL PROPERTY?: NO

## 2025-08-31 SDOH — SOCIAL STABILITY: SOCIAL INSECURITY: DOES ANYONE TRY TO KEEP YOU FROM HAVING/CONTACTING OTHER FRIENDS OR DOING THINGS OUTSIDE YOUR HOME?: NO

## 2025-08-31 SDOH — SOCIAL STABILITY: SOCIAL INSECURITY: ARE THERE ANY APPARENT SIGNS OF INJURIES/BEHAVIORS THAT COULD BE RELATED TO ABUSE/NEGLECT?: NO

## 2025-08-31 ASSESSMENT — PAIN SCALES - GENERAL
PAINLEVEL_OUTOF10: 0 - NO PAIN
PAINLEVEL_OUTOF10: 0 - NO PAIN
PAINLEVEL_OUTOF10: 3
PAINLEVEL_OUTOF10: 0 - NO PAIN

## 2025-08-31 ASSESSMENT — PATIENT HEALTH QUESTIONNAIRE - PHQ9
2. FEELING DOWN, DEPRESSED OR HOPELESS: NOT AT ALL
SUM OF ALL RESPONSES TO PHQ9 QUESTIONS 1 & 2: 0
1. LITTLE INTEREST OR PLEASURE IN DOING THINGS: NOT AT ALL

## 2025-08-31 ASSESSMENT — LIFESTYLE VARIABLES
SKIP TO QUESTIONS 9-10: 1
HOW OFTEN DO YOU HAVE A DRINK CONTAINING ALCOHOL: NEVER
HOW OFTEN DO YOU HAVE 6 OR MORE DRINKS ON ONE OCCASION: NEVER
AUDIT-C TOTAL SCORE: 0
AUDIT-C TOTAL SCORE: 0
HOW MANY STANDARD DRINKS CONTAINING ALCOHOL DO YOU HAVE ON A TYPICAL DAY: PATIENT DOES NOT DRINK

## 2025-08-31 ASSESSMENT — ACTIVITIES OF DAILY LIVING (ADL): LACK_OF_TRANSPORTATION: NO

## 2025-09-01 ENCOUNTER — ANESTHESIA (OUTPATIENT)
Dept: OBSTETRICS AND GYNECOLOGY | Facility: HOSPITAL | Age: 30
End: 2025-09-01
Payer: COMMERCIAL

## 2025-09-01 ENCOUNTER — ANESTHESIA EVENT (OUTPATIENT)
Dept: OBSTETRICS AND GYNECOLOGY | Facility: HOSPITAL | Age: 30
End: 2025-09-01
Payer: COMMERCIAL

## 2025-09-01 LAB
BASE EXCESS BLDCOA CALC-SCNC: -11.5 MMOL/L (ref -10.8–-0.5)
BASE EXCESS BLDCOV CALC-SCNC: -6 MMOL/L (ref -8.1–-0.5)
BODY TEMPERATURE: 37 DEGREES CELSIUS
BODY TEMPERATURE: 37 DEGREES CELSIUS
ERYTHROCYTE [DISTWIDTH] IN BLOOD BY AUTOMATED COUNT: 13.1 % (ref 11.5–14.5)
HCO3 BLDCOA-SCNC: 20.6 MMOL/L (ref 15–29)
HCO3 BLDCOV-SCNC: 20.7 MMOL/L (ref 16–26)
HCT VFR BLD AUTO: 33.5 % (ref 36–46)
HGB BLD-MCNC: 11.2 G/DL (ref 12–16)
INHALED O2 CONCENTRATION: 21 %
INHALED O2 CONCENTRATION: 21 %
MCH RBC QN AUTO: 29.6 PG (ref 26–34)
MCHC RBC AUTO-ENTMCNC: 33.4 G/DL (ref 32–36)
MCV RBC AUTO: 89 FL (ref 80–100)
NRBC BLD-RTO: 0 /100 WBCS (ref 0–0)
OXYHGB MFR BLDCOA: 42.6 % (ref 94–98)
OXYHGB MFR BLDCOV: 64.3 % (ref 94–98)
PCO2 BLDCOA: 78 MM HG (ref 31–75)
PCO2 BLDCOV: 44 MM HG (ref 22–53)
PH BLDCOA: 7.03 PH (ref 7.08–7.39)
PH BLDCOV: 7.28 PH (ref 7.19–7.47)
PLATELET # BLD AUTO: 132 X10*3/UL (ref 150–450)
PO2 BLDCOA: 28 MM HG (ref 5–31)
PO2 BLDCOV: 32 MM HG (ref 13–37)
RBC # BLD AUTO: 3.78 X10*6/UL (ref 4–5.2)
SAO2 % BLDCOA: 43 % (ref 3–69)
SAO2 % BLDCOV: 66 % (ref 16–84)
TREPONEMA PALLIDUM IGG+IGM AB [PRESENCE] IN SERUM OR PLASMA BY IMMUNOASSAY: NONREACTIVE
WBC # BLD AUTO: 11.4 X10*3/UL (ref 4.4–11.3)

## 2025-09-01 PROCEDURE — 2500000004 HC RX 250 GENERAL PHARMACY W/ HCPCS (ALT 636 FOR OP/ED): Performed by: ADVANCED PRACTICE MIDWIFE

## 2025-09-01 PROCEDURE — 2500000005 HC RX 250 GENERAL PHARMACY W/O HCPCS: Performed by: ADVANCED PRACTICE MIDWIFE

## 2025-09-01 PROCEDURE — 1100000001 HC PRIVATE ROOM DAILY

## 2025-09-01 PROCEDURE — 59400 OBSTETRICAL CARE: CPT | Performed by: OBSTETRICS & GYNECOLOGY

## 2025-09-01 PROCEDURE — 82805 BLOOD GASES W/O2 SATURATION: CPT | Performed by: MIDWIFE

## 2025-09-01 PROCEDURE — 7100000016 HC LABOR RECOVERY PER HOUR

## 2025-09-01 PROCEDURE — 2500000005 HC RX 250 GENERAL PHARMACY W/O HCPCS: Performed by: MIDWIFE

## 2025-09-01 PROCEDURE — 36415 COLL VENOUS BLD VENIPUNCTURE: CPT | Performed by: ADVANCED PRACTICE MIDWIFE

## 2025-09-01 PROCEDURE — 7210000002 HC LABOR PER HOUR

## 2025-09-01 PROCEDURE — 2500000002 HC RX 250 W HCPCS SELF ADMINISTERED DRUGS (ALT 637 FOR MEDICARE OP, ALT 636 FOR OP/ED): Performed by: MIDWIFE

## 2025-09-01 PROCEDURE — 51701 INSERT BLADDER CATHETER: CPT

## 2025-09-01 PROCEDURE — 01967 NEURAXL LBR ANES VAG DLVR: CPT | Performed by: NURSE ANESTHETIST, CERTIFIED REGISTERED

## 2025-09-01 PROCEDURE — 3700000014 EPIDURAL BLOCK: Performed by: NURSE ANESTHETIST, CERTIFIED REGISTERED

## 2025-09-01 PROCEDURE — 2500000001 HC RX 250 WO HCPCS SELF ADMINISTERED DRUGS (ALT 637 FOR MEDICARE OP): Performed by: ADVANCED PRACTICE MIDWIFE

## 2025-09-01 PROCEDURE — 2500000004 HC RX 250 GENERAL PHARMACY W/ HCPCS (ALT 636 FOR OP/ED): Performed by: MIDWIFE

## 2025-09-01 PROCEDURE — 2500000002 HC RX 250 W HCPCS SELF ADMINISTERED DRUGS (ALT 637 FOR MEDICARE OP, ALT 636 FOR OP/ED): Performed by: ADVANCED PRACTICE MIDWIFE

## 2025-09-01 PROCEDURE — 2500000004 HC RX 250 GENERAL PHARMACY W/ HCPCS (ALT 636 FOR OP/ED): Performed by: NURSE ANESTHETIST, CERTIFIED REGISTERED

## 2025-09-01 PROCEDURE — 85027 COMPLETE CBC AUTOMATED: CPT | Performed by: ADVANCED PRACTICE MIDWIFE

## 2025-09-01 RX ORDER — LOPERAMIDE HYDROCHLORIDE 2 MG/1
4 CAPSULE ORAL EVERY 2 HOUR PRN
Status: DISCONTINUED | OUTPATIENT
Start: 2025-09-01 | End: 2025-09-04 | Stop reason: HOSPADM

## 2025-09-01 RX ORDER — MISOPROSTOL 200 UG/1
800 TABLET ORAL ONCE AS NEEDED
Status: DISCONTINUED | OUTPATIENT
Start: 2025-09-01 | End: 2025-09-04 | Stop reason: HOSPADM

## 2025-09-01 RX ORDER — OXYTOCIN 10 [USP'U]/ML
10 INJECTION, SOLUTION INTRAMUSCULAR; INTRAVENOUS ONCE AS NEEDED
Status: DISCONTINUED | OUTPATIENT
Start: 2025-09-01 | End: 2025-09-04 | Stop reason: HOSPADM

## 2025-09-01 RX ORDER — LIDOCAINE HYDROCHLORIDE AND EPINEPHRINE 15; 5 MG/ML; UG/ML
INJECTION, SOLUTION EPIDURAL AS NEEDED
Status: DISCONTINUED | OUTPATIENT
Start: 2025-09-01 | End: 2025-09-01

## 2025-09-01 RX ORDER — DIPHENHYDRAMINE HCL 25 MG
25 CAPSULE ORAL EVERY 6 HOURS PRN
Status: DISCONTINUED | OUTPATIENT
Start: 2025-09-01 | End: 2025-09-04 | Stop reason: HOSPADM

## 2025-09-01 RX ORDER — SIMETHICONE 80 MG
80 TABLET,CHEWABLE ORAL 4 TIMES DAILY PRN
Status: DISCONTINUED | OUTPATIENT
Start: 2025-09-01 | End: 2025-09-04 | Stop reason: HOSPADM

## 2025-09-01 RX ORDER — ENOXAPARIN SODIUM 100 MG/ML
40 INJECTION SUBCUTANEOUS EVERY 24 HOURS
Status: DISCONTINUED | OUTPATIENT
Start: 2025-09-01 | End: 2025-09-04 | Stop reason: HOSPADM

## 2025-09-01 RX ORDER — ACETAMINOPHEN 325 MG/1
975 TABLET ORAL EVERY 6 HOURS
Status: DISCONTINUED | OUTPATIENT
Start: 2025-09-01 | End: 2025-09-04 | Stop reason: HOSPADM

## 2025-09-01 RX ORDER — METHYLERGONOVINE MALEATE 0.2 MG/ML
0.2 INJECTION INTRAVENOUS ONCE AS NEEDED
Status: DISCONTINUED | OUTPATIENT
Start: 2025-09-01 | End: 2025-09-04 | Stop reason: HOSPADM

## 2025-09-01 RX ORDER — TRANEXAMIC ACID 1 G/10ML
1000 INJECTION, SOLUTION INTRAVENOUS ONCE AS NEEDED
Status: ACTIVE | OUTPATIENT
Start: 2025-09-01 | End: 2025-09-04

## 2025-09-01 RX ORDER — DIPHENHYDRAMINE HYDROCHLORIDE 50 MG/ML
25 INJECTION, SOLUTION INTRAMUSCULAR; INTRAVENOUS EVERY 6 HOURS PRN
Status: DISCONTINUED | OUTPATIENT
Start: 2025-09-01 | End: 2025-09-04 | Stop reason: HOSPADM

## 2025-09-01 RX ORDER — ONDANSETRON 4 MG/1
4 TABLET, FILM COATED ORAL EVERY 6 HOURS PRN
Status: DISCONTINUED | OUTPATIENT
Start: 2025-09-01 | End: 2025-09-04 | Stop reason: HOSPADM

## 2025-09-01 RX ORDER — BUPIVACAINE HYDROCHLORIDE 2.5 MG/ML
INJECTION, SOLUTION EPIDURAL; INFILTRATION; INTRACAUDAL; PERINEURAL AS NEEDED
Status: DISCONTINUED | OUTPATIENT
Start: 2025-09-01 | End: 2025-09-01

## 2025-09-01 RX ORDER — ADHESIVE BANDAGE
10 BANDAGE TOPICAL
Status: DISCONTINUED | OUTPATIENT
Start: 2025-09-01 | End: 2025-09-04 | Stop reason: HOSPADM

## 2025-09-01 RX ORDER — HYDRALAZINE HYDROCHLORIDE 20 MG/ML
5 INJECTION INTRAMUSCULAR; INTRAVENOUS ONCE AS NEEDED
Status: DISCONTINUED | OUTPATIENT
Start: 2025-09-01 | End: 2025-09-04 | Stop reason: HOSPADM

## 2025-09-01 RX ORDER — CARBOPROST TROMETHAMINE 250 UG/ML
250 INJECTION, SOLUTION INTRAMUSCULAR ONCE AS NEEDED
Status: DISCONTINUED | OUTPATIENT
Start: 2025-09-01 | End: 2025-09-04 | Stop reason: HOSPADM

## 2025-09-01 RX ORDER — IBUPROFEN 600 MG/1
600 TABLET, FILM COATED ORAL EVERY 6 HOURS
Status: DISCONTINUED | OUTPATIENT
Start: 2025-09-01 | End: 2025-09-04 | Stop reason: HOSPADM

## 2025-09-01 RX ORDER — ONDANSETRON HYDROCHLORIDE 2 MG/ML
4 INJECTION, SOLUTION INTRAVENOUS EVERY 6 HOURS PRN
Status: DISCONTINUED | OUTPATIENT
Start: 2025-09-01 | End: 2025-09-04 | Stop reason: HOSPADM

## 2025-09-01 RX ORDER — POLYETHYLENE GLYCOL 3350 17 G/17G
17 POWDER, FOR SOLUTION ORAL 2 TIMES DAILY PRN
Status: DISCONTINUED | OUTPATIENT
Start: 2025-09-01 | End: 2025-09-04 | Stop reason: HOSPADM

## 2025-09-01 RX ORDER — FENTANYL/ROPIVACAINE/NS/PF 2MCG/ML-.2
0-25 PLASTIC BAG, INJECTION (ML) INJECTION CONTINUOUS
Status: DISCONTINUED | OUTPATIENT
Start: 2025-09-01 | End: 2025-09-01

## 2025-09-01 RX ORDER — NALBUPHINE HYDROCHLORIDE 10 MG/ML
10 INJECTION INTRAMUSCULAR; INTRAVENOUS; SUBCUTANEOUS
Status: DISCONTINUED | OUTPATIENT
Start: 2025-09-01 | End: 2025-09-01

## 2025-09-01 RX ORDER — OXYTOCIN/0.9 % SODIUM CHLORIDE 30/500 ML
60 PLASTIC BAG, INJECTION (ML) INTRAVENOUS ONCE AS NEEDED
Status: DISCONTINUED | OUTPATIENT
Start: 2025-09-01 | End: 2025-09-04 | Stop reason: HOSPADM

## 2025-09-01 RX ORDER — LABETALOL HYDROCHLORIDE 5 MG/ML
20 INJECTION, SOLUTION INTRAVENOUS ONCE AS NEEDED
Status: DISCONTINUED | OUTPATIENT
Start: 2025-09-01 | End: 2025-09-04 | Stop reason: HOSPADM

## 2025-09-01 RX ADMIN — BENZOCAINE AND LEVOMENTHOL 1 APPLICATION: 200; 5 SPRAY TOPICAL at 17:42

## 2025-09-01 RX ADMIN — LIDOCAINE HYDROCHLORIDE 10 ML: 20 SOLUTION ORAL; TOPICAL at 09:17

## 2025-09-01 RX ADMIN — IBUPROFEN 600 MG: 600 TABLET ORAL at 11:33

## 2025-09-01 RX ADMIN — NALBUPHINE HYDROCHLORIDE 10 MG: 10 INJECTION INTRAMUSCULAR; INTRAVENOUS; SUBCUTANEOUS at 00:55

## 2025-09-01 RX ADMIN — Medication 10 ML/HR: at 06:56

## 2025-09-01 RX ADMIN — ACETAMINOPHEN 975 MG: 325 TABLET ORAL at 17:41

## 2025-09-01 RX ADMIN — BUPIVACAINE HYDROCHLORIDE 3 ML: 2.5 INJECTION, SOLUTION EPIDURAL; INFILTRATION; INTRACAUDAL; PERINEURAL at 06:51

## 2025-09-01 RX ADMIN — LIDOCAINE HYDROCHLORIDE,EPINEPHRINE BITARTRATE 5 ML: 15; .005 INJECTION, SOLUTION EPIDURAL; INFILTRATION; INTRACAUDAL; PERINEURAL at 06:41

## 2025-09-01 RX ADMIN — IBUPROFEN 600 MG: 600 TABLET ORAL at 22:50

## 2025-09-01 RX ADMIN — SODIUM CHLORIDE, SODIUM LACTATE, POTASSIUM CHLORIDE, AND CALCIUM CHLORIDE 500 ML: .6; .31; .03; .02 INJECTION, SOLUTION INTRAVENOUS at 10:32

## 2025-09-01 RX ADMIN — BUPIVACAINE HYDROCHLORIDE 3 ML: 2.5 INJECTION, SOLUTION EPIDURAL; INFILTRATION; INTRACAUDAL; PERINEURAL at 06:54

## 2025-09-01 RX ADMIN — ACETAMINOPHEN 975 MG: 325 TABLET ORAL at 11:33

## 2025-09-01 RX ADMIN — ACETAMINOPHEN 975 MG: 325 TABLET ORAL at 22:50

## 2025-09-01 RX ADMIN — METHYLERGONOVINE MALEATE 0.2 MG: 0.2 INJECTION, SOLUTION INTRAMUSCULAR; INTRAVENOUS at 09:57

## 2025-09-01 RX ADMIN — SODIUM CHLORIDE, SODIUM LACTATE, POTASSIUM CHLORIDE, AND CALCIUM CHLORIDE 500 ML: .6; .31; .03; .02 INJECTION, SOLUTION INTRAVENOUS at 05:45

## 2025-09-01 RX ADMIN — ENOXAPARIN SODIUM 40 MG: 100 INJECTION SUBCUTANEOUS at 22:50

## 2025-09-01 RX ADMIN — MISOPROSTOL 800 MCG: 200 TABLET ORAL at 09:54

## 2025-09-01 RX ADMIN — IBUPROFEN 600 MG: 600 TABLET ORAL at 17:41

## 2025-09-01 RX ADMIN — TRANEXAMIC ACID 1000 MG: 1 INJECTION, SOLUTION INTRAVENOUS at 09:59

## 2025-09-01 SDOH — HEALTH STABILITY: MENTAL HEALTH: CURRENT SMOKER: 0

## 2025-09-01 ASSESSMENT — PAIN SCALES - GENERAL
PAINLEVEL_OUTOF10: 3
PAINLEVEL_OUTOF10: 5 - MODERATE PAIN
PAINLEVEL_OUTOF10: 3
PAINLEVEL_OUTOF10: 4
PAINLEVEL_OUTOF10: 5 - MODERATE PAIN
PAINLEVEL_OUTOF10: 4
PAINLEVEL_OUTOF10: 5 - MODERATE PAIN
PAINLEVEL_OUTOF10: 0 - NO PAIN
PAIN_LEVEL: 0
PAINLEVEL_OUTOF10: 4
PAINLEVEL_OUTOF10: 0 - NO PAIN
PAINLEVEL_OUTOF10: 1
PAINLEVEL_OUTOF10: 0 - NO PAIN
PAINLEVEL_OUTOF10: 8
PAINLEVEL_OUTOF10: 0 - NO PAIN
PAINLEVEL_OUTOF10: 3

## 2025-09-01 ASSESSMENT — PAIN DESCRIPTION - DESCRIPTORS: DESCRIPTORS: ACHING

## 2025-09-01 ASSESSMENT — PAIN DESCRIPTION - LOCATION: LOCATION: ABDOMEN

## 2025-09-01 ASSESSMENT — PAIN - FUNCTIONAL ASSESSMENT: PAIN_FUNCTIONAL_ASSESSMENT: 0-10

## 2025-09-02 PROBLEM — Z37.9 VACUUM-ASSISTED VAGINAL DELIVERY (HHS-HCC): Status: ACTIVE | Noted: 2025-09-02

## 2025-09-02 PROCEDURE — 2500000001 HC RX 250 WO HCPCS SELF ADMINISTERED DRUGS (ALT 637 FOR MEDICARE OP): Performed by: ADVANCED PRACTICE MIDWIFE

## 2025-09-02 PROCEDURE — 2500000004 HC RX 250 GENERAL PHARMACY W/ HCPCS (ALT 636 FOR OP/ED): Performed by: ADVANCED PRACTICE MIDWIFE

## 2025-09-02 PROCEDURE — 2500000001 HC RX 250 WO HCPCS SELF ADMINISTERED DRUGS (ALT 637 FOR MEDICARE OP)

## 2025-09-02 PROCEDURE — 1100000001 HC PRIVATE ROOM DAILY

## 2025-09-02 RX ORDER — DOCUSATE SODIUM 100 MG/1
100 CAPSULE, LIQUID FILLED ORAL 2 TIMES DAILY
Status: DISCONTINUED | OUTPATIENT
Start: 2025-09-02 | End: 2025-09-04 | Stop reason: HOSPADM

## 2025-09-02 RX ORDER — FERROUS SULFATE 325(65) MG
65 TABLET ORAL
Status: DISCONTINUED | OUTPATIENT
Start: 2025-09-02 | End: 2025-09-04 | Stop reason: HOSPADM

## 2025-09-02 RX ADMIN — IBUPROFEN 600 MG: 600 TABLET ORAL at 04:31

## 2025-09-02 RX ADMIN — ACETAMINOPHEN 975 MG: 325 TABLET ORAL at 10:57

## 2025-09-02 RX ADMIN — IBUPROFEN 600 MG: 600 TABLET ORAL at 22:24

## 2025-09-02 RX ADMIN — IBUPROFEN 600 MG: 600 TABLET ORAL at 10:57

## 2025-09-02 RX ADMIN — DOCUSATE SODIUM 100 MG: 100 CAPSULE, LIQUID FILLED ORAL at 22:23

## 2025-09-02 RX ADMIN — ACETAMINOPHEN 975 MG: 325 TABLET ORAL at 22:21

## 2025-09-02 RX ADMIN — ACETAMINOPHEN 975 MG: 325 TABLET ORAL at 04:31

## 2025-09-02 RX ADMIN — IBUPROFEN 600 MG: 600 TABLET ORAL at 16:33

## 2025-09-02 RX ADMIN — FERROUS SULFATE TAB 325 MG (65 MG ELEMENTAL FE) 1 TABLET: 325 (65 FE) TAB at 11:52

## 2025-09-02 RX ADMIN — ACETAMINOPHEN 975 MG: 325 TABLET ORAL at 16:33

## 2025-09-02 RX ADMIN — ENOXAPARIN SODIUM 40 MG: 100 INJECTION SUBCUTANEOUS at 22:23

## 2025-09-02 RX ADMIN — DOCUSATE SODIUM 100 MG: 100 CAPSULE, LIQUID FILLED ORAL at 10:57

## 2025-09-02 ASSESSMENT — PAIN SCALES - GENERAL
PAINLEVEL_OUTOF10: 0 - NO PAIN
PAINLEVEL_OUTOF10: 6
PAINLEVEL_OUTOF10: 5 - MODERATE PAIN

## 2025-09-02 ASSESSMENT — PAIN DESCRIPTION - DESCRIPTORS: DESCRIPTORS: CRAMPING

## 2025-09-03 ENCOUNTER — APPOINTMENT (OUTPATIENT)
Dept: OBSTETRICS AND GYNECOLOGY | Facility: CLINIC | Age: 30
End: 2025-09-03
Payer: COMMERCIAL

## 2025-09-03 PROBLEM — O13.9 GESTATIONAL HYPERTENSION, ANTEPARTUM (HHS-HCC): Status: ACTIVE | Noted: 2025-09-03

## 2025-09-03 LAB
ALBUMIN SERPL BCP-MCNC: 2.7 G/DL (ref 3.4–5)
ALP SERPL-CCNC: 126 U/L (ref 33–110)
ALT SERPL W P-5'-P-CCNC: 10 U/L (ref 7–45)
ANION GAP SERPL CALC-SCNC: 9 MMOL/L (ref 10–20)
AST SERPL W P-5'-P-CCNC: 21 U/L (ref 9–39)
BILIRUB SERPL-MCNC: 0.2 MG/DL (ref 0–1.2)
BUN SERPL-MCNC: 13 MG/DL (ref 6–23)
CALCIUM SERPL-MCNC: 8 MG/DL (ref 8.6–10.3)
CHLORIDE SERPL-SCNC: 108 MMOL/L (ref 98–107)
CO2 SERPL-SCNC: 24 MMOL/L (ref 21–32)
CREAT SERPL-MCNC: 0.55 MG/DL (ref 0.5–1.05)
EGFRCR SERPLBLD CKD-EPI 2021: >90 ML/MIN/1.73M*2
ERYTHROCYTE [DISTWIDTH] IN BLOOD BY AUTOMATED COUNT: 13.4 % (ref 11.5–14.5)
GLUCOSE SERPL-MCNC: 71 MG/DL (ref 74–99)
HCT VFR BLD AUTO: 30.3 % (ref 36–46)
HGB BLD-MCNC: 10 G/DL (ref 12–16)
MCH RBC QN AUTO: 29.5 PG (ref 26–34)
MCHC RBC AUTO-ENTMCNC: 33 G/DL (ref 32–36)
MCV RBC AUTO: 89 FL (ref 80–100)
NRBC BLD-RTO: 0 /100 WBCS (ref 0–0)
PLATELET # BLD AUTO: 179 X10*3/UL (ref 150–450)
POTASSIUM SERPL-SCNC: 4.1 MMOL/L (ref 3.5–5.3)
PROT SERPL-MCNC: 5.1 G/DL (ref 6.4–8.2)
RBC # BLD AUTO: 3.39 X10*6/UL (ref 4–5.2)
SODIUM SERPL-SCNC: 137 MMOL/L (ref 136–145)
WBC # BLD AUTO: 7.5 X10*3/UL (ref 4.4–11.3)

## 2025-09-03 PROCEDURE — 2500000001 HC RX 250 WO HCPCS SELF ADMINISTERED DRUGS (ALT 637 FOR MEDICARE OP): Performed by: ADVANCED PRACTICE MIDWIFE

## 2025-09-03 PROCEDURE — 1100000001 HC PRIVATE ROOM DAILY

## 2025-09-03 PROCEDURE — 2500000001 HC RX 250 WO HCPCS SELF ADMINISTERED DRUGS (ALT 637 FOR MEDICARE OP)

## 2025-09-03 PROCEDURE — 2500000004 HC RX 250 GENERAL PHARMACY W/ HCPCS (ALT 636 FOR OP/ED): Performed by: ADVANCED PRACTICE MIDWIFE

## 2025-09-03 PROCEDURE — RXMED WILLOW AMBULATORY MEDICATION CHARGE

## 2025-09-03 PROCEDURE — 36415 COLL VENOUS BLD VENIPUNCTURE: CPT

## 2025-09-03 PROCEDURE — 85027 COMPLETE CBC AUTOMATED: CPT

## 2025-09-03 PROCEDURE — 80053 COMPREHEN METABOLIC PANEL: CPT

## 2025-09-03 RX ORDER — ACETAMINOPHEN 500 MG
1 TABLET ORAL 2 TIMES DAILY
Qty: 1 EACH | Refills: 0 | Status: SHIPPED | OUTPATIENT
Start: 2025-09-03

## 2025-09-03 RX ORDER — DOCUSATE SODIUM 100 MG/1
100 CAPSULE, LIQUID FILLED ORAL 2 TIMES DAILY PRN
Qty: 14 CAPSULE | Refills: 0 | Status: SHIPPED | OUTPATIENT
Start: 2025-09-03

## 2025-09-03 RX ORDER — ACETAMINOPHEN 325 MG/1
975 TABLET ORAL EVERY 6 HOURS
Qty: 120 TABLET | Refills: 0 | Status: SHIPPED | OUTPATIENT
Start: 2025-09-03

## 2025-09-03 RX ORDER — IBUPROFEN 600 MG/1
600 TABLET, FILM COATED ORAL EVERY 6 HOURS
Qty: 60 TABLET | Refills: 0 | Status: SHIPPED | OUTPATIENT
Start: 2025-09-03

## 2025-09-03 RX ADMIN — ACETAMINOPHEN 975 MG: 325 TABLET ORAL at 18:30

## 2025-09-03 RX ADMIN — ACETAMINOPHEN 975 MG: 325 TABLET ORAL at 04:30

## 2025-09-03 RX ADMIN — IBUPROFEN 600 MG: 600 TABLET ORAL at 12:55

## 2025-09-03 RX ADMIN — ENOXAPARIN SODIUM 40 MG: 100 INJECTION SUBCUTANEOUS at 22:10

## 2025-09-03 RX ADMIN — IBUPROFEN 600 MG: 600 TABLET ORAL at 04:30

## 2025-09-03 RX ADMIN — ACETAMINOPHEN 975 MG: 325 TABLET ORAL at 12:55

## 2025-09-03 RX ADMIN — DOCUSATE SODIUM 100 MG: 100 CAPSULE, LIQUID FILLED ORAL at 20:55

## 2025-09-03 RX ADMIN — IBUPROFEN 600 MG: 600 TABLET ORAL at 18:30

## 2025-09-03 RX ADMIN — DOCUSATE SODIUM 100 MG: 100 CAPSULE, LIQUID FILLED ORAL at 12:55

## 2025-09-03 RX ADMIN — FERROUS SULFATE TAB 325 MG (65 MG ELEMENTAL FE) 1 TABLET: 325 (65 FE) TAB at 13:01

## 2025-09-03 ASSESSMENT — PAIN DESCRIPTION - DESCRIPTORS
DESCRIPTORS: CRAMPING
DESCRIPTORS: CRAMPING

## 2025-09-03 ASSESSMENT — PAIN SCALES - GENERAL
PAINLEVEL_OUTOF10: 1
PAINLEVEL_OUTOF10: 0 - NO PAIN
PAINLEVEL_OUTOF10: 3
PAINLEVEL_OUTOF10: 2

## 2025-09-04 ENCOUNTER — PHARMACY VISIT (OUTPATIENT)
Dept: PHARMACY | Facility: CLINIC | Age: 30
End: 2025-09-04
Payer: COMMERCIAL

## 2025-09-04 VITALS
HEART RATE: 76 BPM | TEMPERATURE: 98.1 F | BODY MASS INDEX: 38.71 KG/M2 | HEIGHT: 64 IN | WEIGHT: 226.74 LBS | RESPIRATION RATE: 16 BRPM | OXYGEN SATURATION: 96 % | DIASTOLIC BLOOD PRESSURE: 84 MMHG | SYSTOLIC BLOOD PRESSURE: 124 MMHG

## 2025-09-04 PROCEDURE — 2500000001 HC RX 250 WO HCPCS SELF ADMINISTERED DRUGS (ALT 637 FOR MEDICARE OP): Performed by: ADVANCED PRACTICE MIDWIFE

## 2025-09-04 PROCEDURE — 2500000001 HC RX 250 WO HCPCS SELF ADMINISTERED DRUGS (ALT 637 FOR MEDICARE OP)

## 2025-09-04 PROCEDURE — 99239 HOSP IP/OBS DSCHRG MGMT >30: CPT

## 2025-09-04 PROCEDURE — RXMED WILLOW AMBULATORY MEDICATION CHARGE

## 2025-09-04 RX ADMIN — ACETAMINOPHEN 975 MG: 325 TABLET ORAL at 12:33

## 2025-09-04 RX ADMIN — IBUPROFEN 600 MG: 600 TABLET ORAL at 01:07

## 2025-09-04 RX ADMIN — ACETAMINOPHEN 975 MG: 325 TABLET ORAL at 01:07

## 2025-09-04 RX ADMIN — FERROUS SULFATE TAB 325 MG (65 MG ELEMENTAL FE) 1 TABLET: 325 (65 FE) TAB at 07:48

## 2025-09-04 RX ADMIN — DOCUSATE SODIUM 100 MG: 100 CAPSULE, LIQUID FILLED ORAL at 09:13

## 2025-09-04 RX ADMIN — ACETAMINOPHEN 975 MG: 325 TABLET ORAL at 06:32

## 2025-09-04 RX ADMIN — IBUPROFEN 600 MG: 600 TABLET ORAL at 12:33

## 2025-09-04 RX ADMIN — IBUPROFEN 600 MG: 600 TABLET ORAL at 06:32

## 2025-09-04 ASSESSMENT — PAIN SCALES - GENERAL: PAINLEVEL_OUTOF10: 0 - NO PAIN

## 2025-09-04 ASSESSMENT — PAIN DESCRIPTION - DESCRIPTORS: DESCRIPTORS: CRAMPING

## 2025-09-17 ENCOUNTER — APPOINTMENT (OUTPATIENT)
Dept: OBSTETRICS AND GYNECOLOGY | Facility: CLINIC | Age: 30
End: 2025-09-17
Payer: COMMERCIAL